# Patient Record
Sex: FEMALE | Race: WHITE | NOT HISPANIC OR LATINO
[De-identification: names, ages, dates, MRNs, and addresses within clinical notes are randomized per-mention and may not be internally consistent; named-entity substitution may affect disease eponyms.]

---

## 2020-11-16 ENCOUNTER — APPOINTMENT (OUTPATIENT)
Dept: VASCULAR SURGERY | Facility: CLINIC | Age: 80
End: 2020-11-16
Payer: MEDICARE

## 2020-11-16 PROCEDURE — 29580 STRAPPING UNNA BOOT: CPT | Mod: RT

## 2020-11-16 PROCEDURE — 99203 OFFICE O/P NEW LOW 30 MIN: CPT | Mod: 25

## 2020-11-16 NOTE — PHYSICAL EXAM
[Respiratory Effort] : normal respiratory effort [Normal Rate and Rhythm] : normal rate and rhythm [Alert] : alert [Oriented to Person] : oriented to person [Oriented to Place] : oriented to place [Oriented to Time] : oriented to time [Calm] : calm [2+] : left 2+ [Ankle Swelling (On Exam)] : present [Ankle Swelling On The Right] : mild [Varicose Veins Of Lower Extremities] : not present [] : not present [Abdomen Tenderness] : ~T ~M No abdominal tenderness [de-identified] : Well appearing in no apparent acute distress  [de-identified] : NC/AT  [de-identified] : Supple  [de-identified] : FROM [de-identified] : RLE: small ulceration to the medial aspect of the malleolar region 0.3x0.4 cm with no active drainage, no surrounding erythema and no signs of infection. Feet are pink, toes are warm to touch with adequate capillary refill.

## 2020-11-16 NOTE — HISTORY OF PRESENT ILLNESS
[FreeTextEntry1] : 79 y/o F known w/hx venous insufficiency s/p BLE RFA (2005) presents for initial evaluation of RLE venous ulcer. Pt notice small ulceration to the medial aspect of the R malleolar that is mildly painful. She reports she is active through daily walking and uses cane for balance. \par Denies any skin breakdown, ulcerations, open sores, fever or chills.

## 2020-11-16 NOTE — PROCEDURE
[FreeTextEntry1] : RLE Wound care: Wounds cleaned with hydrogen peroxide, A&D ointment applied. Wrapped with unna boot, Kerlix and ACE bandage.\par

## 2020-11-16 NOTE — ASSESSMENT
[FreeTextEntry1] : 80 y /o F w/hx of BLE venous insufficiency s/p BLE RFA presents with RLE ulcer. On exam, RLE: small ulceration to the medial aspect of the malleolar region 0.3x0.4 cm with no active drainage, no surrounding erythema and no signs of infection. Feet are pink, toes are warm to touch with adequate capillary refill. RLE Unna boot place, to f/u with us here in 1 week for a new unna boot placement. No clinical signs of infection and no abx required at the moment. Patient to contact the office in case she develops any concerning symptoms. \par

## 2020-11-16 NOTE — ADDENDUM
[FreeTextEntry1] : This note was written by Jackie Ruvalcaba on 11/16/2020 acting as scribe for Sherry Dorsey M.D.\par \par I, Sherry Cardenas have read and attest that all the information, medical decision making and discharge instructions within are true and accurate.

## 2020-11-23 ENCOUNTER — APPOINTMENT (OUTPATIENT)
Dept: VASCULAR SURGERY | Facility: CLINIC | Age: 80
End: 2020-11-23
Payer: MEDICARE

## 2020-11-23 PROCEDURE — 99212 OFFICE O/P EST SF 10 MIN: CPT | Mod: 25

## 2020-11-23 PROCEDURE — 29580 STRAPPING UNNA BOOT: CPT | Mod: RT

## 2020-11-24 NOTE — HISTORY OF PRESENT ILLNESS
[FreeTextEntry1] : 79 y/o F known w/hx venous insufficiency s/p BLE RFA (2005) presents for follow up evaluation of RLE venous ulcer. Pt with slowly healing small ulceration to the medial aspect of the R malleolar. She c/o mild pain but reports she has been active through daily walking and uses cane for balance. \par Denies any skin breakdown, ulcerations, open sores, fever or chills.

## 2020-11-24 NOTE — ASSESSMENT
[FreeTextEntry1] : 80 y /o F w/hx of BLE venous insufficiency s/p BLE RFA and RLE ulcer undergoing UNNA boot wraps presents for f/u. On exam, RLE: small ulceration to the medial aspect of the malleolar region 0.2 x0.3cm with no active drainage, no surrounding erythema and no signs of infection. Feet are pink, toes are warm to touch with adequate capillary refill. \par RLE Unna boot place, to f/u with us here in 1 week for a new unna boot placement.  [Arterial/Venous Disease] : arterial/venous disease [Ulcer Care] : ulcer care

## 2020-11-24 NOTE — PHYSICAL EXAM
[Respiratory Effort] : normal respiratory effort [Normal Rate and Rhythm] : normal rate and rhythm [2+] : left 2+ [Ankle Swelling (On Exam)] : present [Ankle Swelling On The Right] : mild [Alert] : alert [Oriented to Person] : oriented to person [Oriented to Place] : oriented to place [Oriented to Time] : oriented to time [Calm] : calm [Varicose Veins Of Lower Extremities] : not present [] : not present [Abdomen Tenderness] : ~T ~M No abdominal tenderness [de-identified] : Well appearing in no apparent acute distress  [de-identified] : NC/AT  [de-identified] : Supple  [de-identified] : FROM [de-identified] : RLE: small ulceration to the medial aspect of the malleolar region 0.2 x0.3cm with no active drainage, no surrounding erythema and no signs of infection. Feet are pink, toes are warm to touch with adequate capillary refill.

## 2020-11-24 NOTE — ADDENDUM
[FreeTextEntry1] : This note was written by Jackie Ruvalcaba on 11/23/2020 acting as scribe for Sherry Dorsey M.D.\par \par I, Sherry Cardenas have read and attest that all the information, medical decision making and discharge instructions within are true and accurate.

## 2020-11-30 ENCOUNTER — APPOINTMENT (OUTPATIENT)
Dept: VASCULAR SURGERY | Facility: CLINIC | Age: 80
End: 2020-11-30
Payer: MEDICARE

## 2020-11-30 PROCEDURE — 29580 STRAPPING UNNA BOOT: CPT | Mod: RT

## 2020-11-30 PROCEDURE — 99212 OFFICE O/P EST SF 10 MIN: CPT | Mod: 25

## 2020-12-01 NOTE — PHYSICAL EXAM
[Respiratory Effort] : normal respiratory effort [Normal Rate and Rhythm] : normal rate and rhythm [2+] : left 2+ [Ankle Swelling (On Exam)] : present [Ankle Swelling On The Right] : mild [Alert] : alert [Oriented to Person] : oriented to person [Oriented to Place] : oriented to place [Oriented to Time] : oriented to time [Calm] : calm [Varicose Veins Of Lower Extremities] : not present [] : not present [Abdomen Tenderness] : ~T ~M No abdominal tenderness [de-identified] : Well appearing in no apparent acute distress  [de-identified] : NC/AT  [de-identified] : Supple  [de-identified] : FROM [de-identified] : RLE: small ulceration to the medial aspect of the malleolar region 0.1 x0.2 cm with no active drainage, no surrounding erythema and no signs of infection. Feet are pink, toes are warm to touch with adequate capillary refill.

## 2020-12-01 NOTE — HISTORY OF PRESENT ILLNESS
[FreeTextEntry1] : 81 y/o F w/hx venous insufficiency s/p BLE RFA (2005) presents for follow up evaluation of RLE venous ulcer. Pt undergoing treatment with UNNA boots for slowly healing small ulceration to the medial aspect of the R malleolus . She c/o mild pain but reports she has been active through daily walking and uses cane for balance. She removed the UNNA boot yesterday and is here today for a new one to be placed. \par Denies any skin breakdown, ulcerations, open sores, fever or chills.

## 2020-12-01 NOTE — ASSESSMENT
[FreeTextEntry1] : 80 y /o F w/hx of BLE venous insufficiency s/p BLE RFA and RLE ulcer undergoing UNNA boot wraps presents for f/u On exam, RLE: small ulceration to the medial aspect of the malleolar region 0.1 x 0.2 cm with no active drainage, no surrounding erythema and no signs of infection. Feet are pink, toes are warm to touch with adequate capillary refill. \par RLE Unna boot place, to f/u with us here in 1 week for a new unna boot placement.

## 2020-12-07 ENCOUNTER — APPOINTMENT (OUTPATIENT)
Dept: VASCULAR SURGERY | Facility: CLINIC | Age: 80
End: 2020-12-07
Payer: MEDICARE

## 2020-12-07 PROCEDURE — 29580 STRAPPING UNNA BOOT: CPT | Mod: RT

## 2020-12-07 PROCEDURE — 99212 OFFICE O/P EST SF 10 MIN: CPT | Mod: 25

## 2020-12-07 NOTE — PHYSICAL EXAM
[Respiratory Effort] : normal respiratory effort [Normal Rate and Rhythm] : normal rate and rhythm [2+] : left 2+ [Ankle Swelling (On Exam)] : present [Ankle Swelling On The Right] : mild [Alert] : alert [Oriented to Person] : oriented to person [Oriented to Place] : oriented to place [Oriented to Time] : oriented to time [Calm] : calm [Varicose Veins Of Lower Extremities] : not present [] : not present [Abdomen Tenderness] : ~T ~M No abdominal tenderness [de-identified] : Well appearing in no apparent acute distress  [de-identified] : NC/AT  [de-identified] : Supple  [de-identified] : FROM [de-identified] : RLE: small ulceration to the medial aspect of the malleolar region 0.7 x0.1 cm with no active drainage, no surrounding erythema and no signs of infection. Feet are pink, toes are warm to touch with adequate capillary refill.

## 2020-12-07 NOTE — HISTORY OF PRESENT ILLNESS
[FreeTextEntry1] : 81 y/o F w/hx venous insufficiency s/p BLE RFA (2005) presents for follow up evaluation of RLE venous ulcer. Pt undergoing treatment with UNNA boots for slowly healing small ulceration to the medial aspect of the R malleolus . She continues to c/o mild pain but has been somewhat controlled through the past week. She has been active through daily walking and uses cane for balance. She removed the UNNA boot yesterday and is here today for a new one to be placed. \par Denies any skin breakdown, ulcerations, open sores, fever or chills.

## 2020-12-07 NOTE — ASSESSMENT
[FreeTextEntry1] : 80 y /o F w/hx of BLE venous insufficiency s/p BLE RFA and RLE ulcer undergoing UNNA boot wraps presents for f/u On exam, RLE: small ulceration to the medial aspect of the malleolar region 0.7 x 0.1 cm with no active drainage, no surrounding erythema and no signs of infection. Feet are pink, toes are warm to touch with adequate capillary refill. \par RLE Unna boot place, to f/u with us here in 1 week for a new unna boot placement.  [Foot care/Footwear] : foot care/footwear [Ulcer Care] : ulcer care

## 2020-12-07 NOTE — ADDENDUM
[FreeTextEntry1] : This note was written by Jackie Ruvalcaba on 12/07/2020 acting as scribe for Sherry Dorsey M.D.\par \par I, Sherry Cardenas have read and attest that all the information, medical decision making and discharge instructions within are true and accurate.

## 2020-12-14 ENCOUNTER — APPOINTMENT (OUTPATIENT)
Dept: VASCULAR SURGERY | Facility: CLINIC | Age: 80
End: 2020-12-14
Payer: MEDICARE

## 2020-12-14 DIAGNOSIS — L97.909 NON-PRESSURE CHRONIC ULCER OF UNSPECIFIED PART OF UNSPECIFIED LOWER LEG WITH UNSPECIFIED SEVERITY: ICD-10-CM

## 2020-12-14 PROCEDURE — 29580 STRAPPING UNNA BOOT: CPT | Mod: RT

## 2020-12-14 PROCEDURE — 99212 OFFICE O/P EST SF 10 MIN: CPT | Mod: 25

## 2020-12-16 PROBLEM — L97.909 ULCER OF LOWER EXTREMITY, UNSPECIFIED LATERALITY, UNSPECIFIED ULCER STAGE: Status: ACTIVE | Noted: 2020-12-16

## 2020-12-16 RX ORDER — SILVER SULFADIAZINE 10 MG/G
1 CREAM TOPICAL DAILY
Qty: 30 | Refills: 0 | Status: ACTIVE | COMMUNITY
Start: 2020-12-16 | End: 1900-01-01

## 2020-12-16 NOTE — ADDENDUM
[FreeTextEntry1] : This note was written by Jackie Ruvalcaba on 12/14/2020 acting as scribe for Sherry Dorsey M.D.\par \par I, Sherry Cardenas have read and attest that all the information, medical decision making and discharge instructions within are true and accurate.

## 2020-12-16 NOTE — ASSESSMENT
[FreeTextEntry1] : 80 y /o F w/hx of BLE venous insufficiency s/p BLE RFA and RLE ulcer undergoing UNNA boot wraps presents for f/u On exam, RLE: small ulceration to the medial aspect of the malleolar region 0.5 x 0.7 cm with no active drainage, no surrounding erythema and no signs of infection. Feet are pink, toes are warm to touch with adequate capillary refill. \par RLE Unna boot placed \par \par Plan: \par - She will  f/u with us here in 1 week for a new unna boot placement. \par - Recommended pt continue to activities as tolerated. \par - Patient to contact the office in case she develops any concerning symptoms.  [Foot care/Footwear] : foot care/footwear [Ulcer Care] : ulcer care

## 2020-12-21 ENCOUNTER — APPOINTMENT (OUTPATIENT)
Dept: VASCULAR SURGERY | Facility: CLINIC | Age: 80
End: 2020-12-21
Payer: MEDICARE

## 2020-12-21 DIAGNOSIS — I87.2 VENOUS INSUFFICIENCY (CHRONIC) (PERIPHERAL): ICD-10-CM

## 2020-12-21 PROCEDURE — 99213 OFFICE O/P EST LOW 20 MIN: CPT

## 2020-12-21 RX ORDER — FLUOCINONIDE 0.5 MG/G
0.05 CREAM TOPICAL TWICE DAILY
Qty: 30 | Refills: 0 | Status: ACTIVE | COMMUNITY
Start: 2020-12-21 | End: 1900-01-01

## 2020-12-21 NOTE — PHYSICAL EXAM
[Respiratory Effort] : normal respiratory effort [Normal Rate and Rhythm] : normal rate and rhythm [2+] : left 2+ [Ankle Swelling (On Exam)] : present [Ankle Swelling On The Right] : mild [Alert] : alert [Oriented to Person] : oriented to person [Oriented to Place] : oriented to place [Oriented to Time] : oriented to time [Calm] : calm [Varicose Veins Of Lower Extremities] : not present [] : not present [Abdomen Tenderness] : ~T ~M No abdominal tenderness [de-identified] : Well appearing  [de-identified] : NC/AT  [de-identified] : Supple  [de-identified] : FROM [de-identified] : RLE: small ulceration healed, skin to the medial aspect of the malleolus irritated and slightly red with no active drainage, no surrounding erythema and no signs of infection. Feet are pink, toes are warm to touch with adequate capillary refill.

## 2020-12-21 NOTE — HISTORY OF PRESENT ILLNESS
[FreeTextEntry1] : 81 y/o F w/hx venous insufficiency s/p BLE RFA (2005) presents for follow up evaluation of RLE venous ulcer. Pt undergoing treatment with UNNA boots for healing small ulcer to the medial aspect of the R malleolus. Patient reports feeling well, she reports her superficial ulcer has now healed but notice her leg to become irritated and red. She has been active through daily walking and uses cane for balance. She removed the UNNA boot yesterday.  \par Denies any skin breakdown, ulcerations, open sores, fever or chills.

## 2020-12-21 NOTE — ADDENDUM
[FreeTextEntry1] : This note was written by Jackie Ruvalcaba on 12/21/2020 acting as scribe for Sherry Dorsey M.D.\par \par I, Sherry Cardenas have read and attest that all the information, medical decision making and discharge instructions within are true and accurate.

## 2020-12-21 NOTE — ASSESSMENT
[Foot care/Footwear] : foot care/footwear [Ulcer Care] : ulcer care [FreeTextEntry1] : 80 y /o F w/hx of BLE venous insufficiency s/p BLE RFA and RLE ulcer undergoing UNNA boot wraps presents for f/u On exam, RLE: small ulceration healed, skin to the medial aspect of the malleolus irritated and slightly red with no active drainage, no surrounding erythema and no signs of infection. Feet are pink, toes are warm to touch with adequate capillary refill. \par \par Plan: \par - Patient with RLE small ulceration healed. \par - Recommended patient to apply Lidex cream daily over R medial aspect of malleolus to help skin irritation. \par - Moisturize skin daily to avoid any skin break down and continue wearing compression stockings daily\par - She will f/u with us here PRN. \par - Patient to contact the office in case she develops any concerning symptoms.

## 2021-01-04 ENCOUNTER — APPOINTMENT (OUTPATIENT)
Dept: VASCULAR SURGERY | Facility: CLINIC | Age: 81
End: 2021-01-04
Payer: MEDICARE

## 2021-01-04 PROCEDURE — 99212 OFFICE O/P EST SF 10 MIN: CPT | Mod: 25

## 2021-01-04 PROCEDURE — 29580 STRAPPING UNNA BOOT: CPT

## 2021-01-04 NOTE — PHYSICAL EXAM
[2+] : left 2+ [Ankle Swelling (On Exam)] : present [Varicose Veins Of Lower Extremities] : not present [] : present [Skin Ulcer] : ulcer [Calm] : calm [de-identified] : pleasant

## 2021-03-11 NOTE — HISTORY OF PRESENT ILLNESS
[FreeTextEntry1] : pt comes in for eval of the ulcer\par pain is less\par she has been treated with unna boots\par 
175.26

## 2023-05-31 ENCOUNTER — APPOINTMENT (OUTPATIENT)
Dept: VASCULAR SURGERY | Facility: CLINIC | Age: 83
End: 2023-05-31

## 2023-06-02 ENCOUNTER — APPOINTMENT (OUTPATIENT)
Dept: VASCULAR SURGERY | Facility: CLINIC | Age: 83
End: 2023-06-02
Payer: MEDICARE

## 2023-06-02 VITALS
SYSTOLIC BLOOD PRESSURE: 117 MMHG | TEMPERATURE: 98.4 F | HEART RATE: 55 BPM | HEIGHT: 70 IN | DIASTOLIC BLOOD PRESSURE: 72 MMHG | OXYGEN SATURATION: 97 % | RESPIRATION RATE: 15 BRPM | WEIGHT: 190 LBS | BODY MASS INDEX: 27.2 KG/M2

## 2023-06-02 DIAGNOSIS — Z86.39 PERSONAL HISTORY OF OTHER ENDOCRINE, NUTRITIONAL AND METABOLIC DISEASE: ICD-10-CM

## 2023-06-02 DIAGNOSIS — Z86.79 PERSONAL HISTORY OF OTHER DISEASES OF THE CIRCULATORY SYSTEM: ICD-10-CM

## 2023-06-02 PROCEDURE — 99203 OFFICE O/P NEW LOW 30 MIN: CPT

## 2023-06-02 PROCEDURE — 93970 EXTREMITY STUDY: CPT

## 2023-06-02 NOTE — HISTORY OF PRESENT ILLNESS
[FreeTextEntry1] : I just had the pleasure of seeing Mrs. Rita Rojo in consultation from  lower extremity venous insufficiency.\par \par Mrs. Rojo is a prashant 82 year old lady who presents with.\par \par Her past medical history is otherwise significant for\par \par Medications: \par \par Allergies: \par \par FH: NC\par \par SH:

## 2023-06-02 NOTE — PHYSICAL EXAM
[Respiratory Effort] : normal respiratory effort [Normal Rate and Rhythm] : normal rate and rhythm [2+] : left 2+ [Ankle Swelling (On Exam)] : present [Ankle Swelling On The Right] : mild [Alert] : alert [Oriented to Person] : oriented to person [Oriented to Place] : oriented to place [Oriented to Time] : oriented to time [Calm] : calm [Varicose Veins Of Lower Extremities] : not present [] : not present [Abdomen Tenderness] : ~T ~M No abdominal tenderness [de-identified] : NC/AT  [de-identified] : Supple  [de-identified] : FROM [de-identified] : RLE: small ulceration to the medial aspect of the malleolar region 0.5 x0..7 cm with no active drainage, no surrounding erythema and no signs of infection. Feet are pink, toes are warm to touch with adequate capillary refill.  [de-identified] : On physical exam, the patient is in no acute distress and neurologically intact. The lungs are clear to auscultation and the heart has a regular rate and rhythm.

## 2023-06-02 NOTE — ASSESSMENT
[FreeTextEntry1] : In summary, Mrs. Rojo presents with a history of bilateral lower extremity venous insufficiency. A venous duplex was obtained in the office today and showed no evidence of DVT/SVT in either leg. There is reflux in an accessory left great saphenous vein, distal (calf) great saphenous vein and small saphenous vein. She would like to undergo intervention for her venous insufficiency but is unsure whether she can travel to [place] for the procedures. She will contact me after she investigates travel options. She would be scheduled for left AGSV and SSV RFA and distal GSV UGFS and left leg stab phlebectomy. \par \par In the meantime, I have instructed her to wear compression stockings daily. \par \par Thank you for allowing me to participate in the care of this nice lady. Please do not hesitate to contact me with any questions.

## 2023-06-02 NOTE — PHYSICAL EXAM
[de-identified] : On physical exam, the patient is in no acute distress and neurologically intact. The lungs are clear to auscultation and the heart has a regular rate and rhythm. Abdomen is benign. Bilateral femoral and pedal pulses are palpable. Left ankle edema is present. Evidence of healed ulcers of bilateral medial ankles.

## 2023-06-02 NOTE — HISTORY OF PRESENT ILLNESS
[FreeTextEntry1] : I just had the pleasure of seeing Mrs. Rita Rojo in consultation from Dr. Ashley La for lower extremity venous insufficiency.\par \par Mrs. Rojo is a prashant 82 year old lady who presents with a history of lower extremity venous insufficiency. She underwent bilateral great saphenous vein ablation and right small saphenous vein radiofrequency ablation by Dr. Holland almost 20 years ago. She has had recurrent episodes of ulceration of bilateral medial calves, most recently treated in 2021 with Unna boot therapy. She reports bilateral leg edema and heaviness. She has been wearing compression stockings daily for many years. She denies any history of lower extremity claudication, rest pain, or tissue loss. 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 Former smoker. Smoked up to 1.2 pack cigarettes/day from 1960-1980s. \par \par FH: NC\par \par ROS: as above

## 2023-06-02 NOTE — PHYSICAL EXAM
[Respiratory Effort] : normal respiratory effort [Normal Rate and Rhythm] : normal rate and rhythm [2+] : left 2+ [Ankle Swelling (On Exam)] : present [Ankle Swelling On The Right] : mild [Alert] : alert [Oriented to Person] : oriented to person [Oriented to Place] : oriented to place [Oriented to Time] : oriented to time [Calm] : calm [Varicose Veins Of Lower Extremities] : not present [] : not present [Abdomen Tenderness] : ~T ~M No abdominal tenderness [de-identified] : NC/AT  [de-identified] : Supple  [de-identified] : FROM [de-identified] : RLE: small ulceration to the medial aspect of the malleolar region 0.5 x0..7 cm with no active drainage, no surrounding erythema and no signs of infection. Feet are pink, toes are warm to touch with adequate capillary refill.  [de-identified] : On physical exam, the patient is in no acute distress and neurologically intact. The lungs are clear to auscultation and the heart has a regular rate and rhythm.

## 2023-06-05 ENCOUNTER — TRANSCRIPTION ENCOUNTER (OUTPATIENT)
Age: 83
End: 2023-06-05

## 2023-11-24 ENCOUNTER — NON-APPOINTMENT (OUTPATIENT)
Age: 83
End: 2023-11-24

## 2023-11-29 ENCOUNTER — APPOINTMENT (OUTPATIENT)
Dept: HEMATOLOGY ONCOLOGY | Facility: CLINIC | Age: 83
End: 2023-11-29
Payer: MEDICARE

## 2023-11-29 ENCOUNTER — LABORATORY RESULT (OUTPATIENT)
Age: 83
End: 2023-11-29

## 2023-11-29 VITALS
SYSTOLIC BLOOD PRESSURE: 142 MMHG | DIASTOLIC BLOOD PRESSURE: 90 MMHG | BODY MASS INDEX: 32.28 KG/M2 | TEMPERATURE: 96.5 F | OXYGEN SATURATION: 96 % | WEIGHT: 171 LBS | HEART RATE: 70 BPM | HEIGHT: 61 IN

## 2023-11-29 DIAGNOSIS — Z78.9 OTHER SPECIFIED HEALTH STATUS: ICD-10-CM

## 2023-11-29 DIAGNOSIS — M19.90 UNSPECIFIED OSTEOARTHRITIS, UNSPECIFIED SITE: ICD-10-CM

## 2023-11-29 DIAGNOSIS — Z83.2 FAMILY HISTORY OF DISEASES OF THE BLOOD AND BLOOD-FORMING ORGANS AND CERTAIN DISORDERS INVOLVING THE IMMUNE MECHANISM: ICD-10-CM

## 2023-11-29 DIAGNOSIS — Z82.61 FAMILY HISTORY OF ARTHRITIS: ICD-10-CM

## 2023-11-29 DIAGNOSIS — H35.30 UNSPECIFIED MACULAR DEGENERATION: ICD-10-CM

## 2023-11-29 DIAGNOSIS — R23.3 SPONTANEOUS ECCHYMOSES: ICD-10-CM

## 2023-11-29 DIAGNOSIS — I10 ESSENTIAL (PRIMARY) HYPERTENSION: ICD-10-CM

## 2023-11-29 DIAGNOSIS — I83.90 ASYMPTOMATIC VARICOSE VEINS OF UNSPECIFIED LOWER EXTREMITY: ICD-10-CM

## 2023-11-29 DIAGNOSIS — Z80.3 FAMILY HISTORY OF MALIGNANT NEOPLASM OF BREAST: ICD-10-CM

## 2023-11-29 DIAGNOSIS — E78.5 HYPERLIPIDEMIA, UNSPECIFIED: ICD-10-CM

## 2023-11-29 DIAGNOSIS — Z87.891 PERSONAL HISTORY OF NICOTINE DEPENDENCE: ICD-10-CM

## 2023-11-29 DIAGNOSIS — Z82.3 FAMILY HISTORY OF STROKE: ICD-10-CM

## 2023-11-29 DIAGNOSIS — Z82.5 FAMILY HISTORY OF ASTHMA AND OTHER CHRONIC LOWER RESPIRATORY DISEASES: ICD-10-CM

## 2023-11-29 PROCEDURE — 36415 COLL VENOUS BLD VENIPUNCTURE: CPT

## 2023-11-29 PROCEDURE — 99204 OFFICE O/P NEW MOD 45 MIN: CPT | Mod: 25

## 2023-11-29 RX ORDER — LOSARTAN POTASSIUM 100 MG/1
100 TABLET, FILM COATED ORAL
Refills: 0 | Status: ACTIVE | COMMUNITY

## 2023-11-29 RX ORDER — VIT C/E/ZN/COPPR/LUTEIN/ZEAXAN 250MG-90MG
CAPSULE ORAL
Refills: 0 | Status: ACTIVE | COMMUNITY

## 2023-11-29 RX ORDER — ATORVASTATIN CALCIUM 20 MG/1
20 TABLET, FILM COATED ORAL
Refills: 0 | Status: ACTIVE | COMMUNITY

## 2023-11-29 RX ORDER — ATENOLOL 25 MG/1
25 TABLET ORAL
Refills: 0 | Status: ACTIVE | COMMUNITY

## 2023-11-29 RX ORDER — GINGER ROOT/GINGER ROOT EXT 262.5 MG
CAPSULE ORAL
Refills: 0 | Status: ACTIVE | COMMUNITY

## 2023-12-01 ENCOUNTER — NON-APPOINTMENT (OUTPATIENT)
Age: 83
End: 2023-12-01

## 2023-12-03 ENCOUNTER — NON-APPOINTMENT (OUTPATIENT)
Age: 83
End: 2023-12-03

## 2023-12-04 ENCOUNTER — LABORATORY RESULT (OUTPATIENT)
Age: 83
End: 2023-12-04

## 2023-12-04 ENCOUNTER — APPOINTMENT (OUTPATIENT)
Dept: HEMATOLOGY ONCOLOGY | Facility: CLINIC | Age: 83
End: 2023-12-04
Payer: MEDICARE

## 2023-12-04 VITALS
DIASTOLIC BLOOD PRESSURE: 70 MMHG | SYSTOLIC BLOOD PRESSURE: 108 MMHG | OXYGEN SATURATION: 97 % | BODY MASS INDEX: 25.91 KG/M2 | HEART RATE: 76 BPM | WEIGHT: 171 LBS | TEMPERATURE: 97.2 F | HEIGHT: 68 IN

## 2023-12-04 DIAGNOSIS — D68.9 COAGULATION DEFECT, UNSPECIFIED: ICD-10-CM

## 2023-12-04 LAB
ABO + RH PNL BLD: NORMAL
ALBUMIN MFR SERPL ELPH: 54.7 %
ALBUMIN SERPL ELPH-MCNC: 4.3 G/DL
ALBUMIN SERPL-MCNC: 4.1 G/DL
ALBUMIN/GLOB SERPL: 1.2 RATIO
ALP BLD-CCNC: 126 U/L
ALPHA1 GLOB MFR SERPL ELPH: 3.9 %
ALPHA1 GLOB SERPL ELPH-MCNC: 0.3 G/DL
ALPHA2 GLOB MFR SERPL ELPH: 11.4 %
ALPHA2 GLOB SERPL ELPH-MCNC: 0.9 G/DL
ALT SERPL-CCNC: 27 U/L
ANION GAP SERPL CALC-SCNC: 12 MMOL/L
APTT BLD: 37.1 SEC
AST SERPL-CCNC: 27 U/L
B-GLOBULIN MFR SERPL ELPH: 8.6 %
B-GLOBULIN SERPL ELPH-MCNC: 0.6 G/DL
B2 MICROGLOB SERPL-MCNC: 3.7 MG/L
BASOPHILS # BLD AUTO: 0.12 K/UL
BASOPHILS # BLD AUTO: 0.12 K/UL
BASOPHILS NFR BLD AUTO: 0.9 %
BASOPHILS NFR BLD AUTO: 0.9 %
BILIRUB SERPL-MCNC: 0.6 MG/DL
BUN SERPL-MCNC: 19 MG/DL
CALCIUM SERPL-MCNC: 10 MG/DL
CHLORIDE SERPL-SCNC: 97 MMOL/L
CO2 SERPL-SCNC: 26 MMOL/L
CREAT SERPL-MCNC: 0.88 MG/DL
CRP SERPL-MCNC: <3 MG/L
DEPRECATED KAPPA LC FREE/LAMBDA SER: 1.25 RATIO
EGFR: 66 ML/MIN/1.73M2
EOSINOPHIL # BLD AUTO: 0.12 K/UL
EOSINOPHIL # BLD AUTO: 0.12 K/UL
EOSINOPHIL NFR BLD AUTO: 0.9 %
EOSINOPHIL NFR BLD AUTO: 0.9 %
EPO SERPL-MCNC: 3.2 MIU/ML
ERYTHROCYTE [SEDIMENTATION RATE] IN BLOOD BY WESTERGREN METHOD: 2 MM/HR
FERRITIN SERPL-MCNC: 141 NG/ML
GAMMA GLOB FLD ELPH-MCNC: 1.6 G/DL
GAMMA GLOB MFR SERPL ELPH: 21.4 %
GLUCOSE SERPL-MCNC: 102 MG/DL
HCT VFR BLD CALC: 50.4 %
HGB BLD-MCNC: 15.9 G/DL
IGA SER QL IEP: 76 MG/DL
IGG SER QL IEP: 2012 MG/DL
IGM SER QL IEP: 58 MG/DL
INR PPP: 0.95
INTERPRETATION SERPL IEP-IMP: NORMAL
IRON SATN MFR SERPL: 36 %
IRON SERPL-MCNC: 101 UG/DL
JAK2 P.V617F BLD/T QL: NORMAL
KAPPA LC CSF-MCNC: 1.83 MG/DL
KAPPA LC SERPL-MCNC: 2.29 MG/DL
LDH SERPL-CCNC: 247 U/L
LYMPHOCYTES # BLD AUTO: 1.23 K/UL
LYMPHOCYTES # BLD AUTO: 1.23 K/UL
LYMPHOCYTES NFR BLD AUTO: 9.5 %
LYMPHOCYTES NFR BLD AUTO: 9.5 %
M PROTEIN MFR SERPL ELPH: 16.9 %
M PROTEIN SPEC IFE-MCNC: NORMAL
MAN DIFF?: NORMAL
MCHC RBC-ENTMCNC: 29.8 PG
MCHC RBC-ENTMCNC: 31.5 GM/DL
MCV RBC AUTO: 94.4 FL
MONOCLON BAND OBS SERPL: 1.3 G/DL
MONOCYTES # BLD AUTO: 0.34 K/UL
MONOCYTES # BLD AUTO: 0.34 K/UL
MONOCYTES NFR BLD AUTO: 2.6 %
MONOCYTES NFR BLD AUTO: 2.6 %
MSMEAR: NORMAL
NEUTROPHILS # BLD AUTO: 11.12 K/UL
NEUTROPHILS # BLD AUTO: 11.12 K/UL
NEUTROPHILS NFR BLD AUTO: 86.1 %
NEUTROPHILS NFR BLD AUTO: 86.1 %
PLAT MORPH BLD: ABNORMAL
PLATELET # BLD AUTO: 905 K/UL
POTASSIUM SERPL-SCNC: 5.8 MMOL/L
PROT SERPL-MCNC: 7.5 G/DL
PT BLD: 10.8 SEC
RBC # BLD: 5.34 M/UL
RBC # BLD: 5.34 M/UL
RBC # FLD: 13.7 %
RBC BLD AUTO: NORMAL
REFLEX:: NORMAL
RETICS # AUTO: 2.5 %
RETICS AGGREG/RBC NFR: 131.4 K/UL
SODIUM SERPL-SCNC: 135 MMOL/L
T(9;22)(ABL1,BCR)/CONTROL BLD/T: NORMAL
TIBC SERPL-MCNC: 282 UG/DL
UIBC SERPL-MCNC: 181 UG/DL
URATE SERPL-MCNC: 5.3 MG/DL
VWF AG PPP IA-ACNC: 153 %
VWF:RCO ACT/NOR PPP PL AGG: 97 %
WBC # FLD AUTO: 12.92 K/UL

## 2023-12-04 PROCEDURE — 99213 OFFICE O/P EST LOW 20 MIN: CPT | Mod: 25

## 2023-12-04 PROCEDURE — 36415 COLL VENOUS BLD VENIPUNCTURE: CPT

## 2023-12-04 RX ORDER — HYDROXYUREA 500 MG/1
500 CAPSULE ORAL
Qty: 60 | Refills: 0 | Status: COMPLETED | COMMUNITY
Start: 2023-12-04 | End: 2024-01-03

## 2023-12-05 ENCOUNTER — NON-APPOINTMENT (OUTPATIENT)
Age: 83
End: 2023-12-05

## 2023-12-05 LAB
ALBUMIN SERPL ELPH-MCNC: 4.1 G/DL
ALP BLD-CCNC: 102 U/L
ALT SERPL-CCNC: 28 U/L
ANION GAP SERPL CALC-SCNC: 9 MMOL/L
APTT BLD: 32.4 SEC
AST SERPL-CCNC: 20 U/L
BASOPHILS # BLD AUTO: 0 K/UL
BASOPHILS # BLD AUTO: 0 K/UL
BASOPHILS NFR BLD AUTO: 0 %
BASOPHILS NFR BLD AUTO: 0 %
BILIRUB SERPL-MCNC: 0.7 MG/DL
BUN SERPL-MCNC: 25 MG/DL
CALCIUM SERPL-MCNC: 9.7 MG/DL
CHLORIDE SERPL-SCNC: 99 MMOL/L
CO2 SERPL-SCNC: 24 MMOL/L
CREAT SERPL-MCNC: 1.08 MG/DL
EGFR: 51 ML/MIN/1.73M2
EOSINOPHIL # BLD AUTO: 0.31 K/UL
EOSINOPHIL # BLD AUTO: 0.31 K/UL
EOSINOPHIL NFR BLD AUTO: 2.6 %
EOSINOPHIL NFR BLD AUTO: 2.6 %
GIANT PLATELETS BLD QL SMEAR: PRESENT
GLUCOSE SERPL-MCNC: 105 MG/DL
HCT VFR BLD CALC: 45.5 %
HGB BLD-MCNC: 14.7 G/DL
INR PPP: 0.95
LDH SERPL-CCNC: 263 U/L
LYMPHOCYTES # BLD AUTO: 1.63 K/UL
LYMPHOCYTES # BLD AUTO: 1.63 K/UL
LYMPHOCYTES NFR BLD AUTO: 13.9 %
LYMPHOCYTES NFR BLD AUTO: 13.9 %
MAN DIFF?: NORMAL
MCHC RBC-ENTMCNC: 29.8 PG
MCHC RBC-ENTMCNC: 32.3 GM/DL
MCV RBC AUTO: 92.3 FL
MONOCYTES # BLD AUTO: 1.02 K/UL
MONOCYTES # BLD AUTO: 1.02 K/UL
MONOCYTES NFR BLD AUTO: 8.7 %
MONOCYTES NFR BLD AUTO: 8.7 %
MPL EXON 10 MUTATION: NORMAL
MSMEAR: NORMAL
NEUTROPHILS # BLD AUTO: 8.8 K/UL
NEUTROPHILS # BLD AUTO: 8.8 K/UL
NEUTROPHILS NFR BLD AUTO: 74.8 %
NEUTROPHILS NFR BLD AUTO: 74.8 %
PLAT MORPH BLD: ABNORMAL
PLATELET # BLD AUTO: 824 K/UL
POTASSIUM SERPL-SCNC: 5.1 MMOL/L
POTASSIUM SERPL-SCNC: 5.9 MMOL/L
PROT SERPL-MCNC: 7.2 G/DL
PT BLD: 10.8 SEC
RBC # BLD: 4.93 M/UL
RBC # BLD: 4.93 M/UL
RBC # FLD: 14.1 %
RBC BLD AUTO: NORMAL
RETICS # AUTO: 2.3 %
RETICS AGGREG/RBC NFR: 114.9 K/UL
SODIUM SERPL-SCNC: 132 MMOL/L
URATE SERPL-MCNC: 5.2 MG/DL
WBC # FLD AUTO: 11.76 K/UL

## 2023-12-06 LAB
CA-I SERPL-SCNC: 5.1 MG/DL
GENE XXX MUT ANL BLD/T: NORMAL

## 2023-12-08 LAB — VISCOSITY, SERUM: 1.9

## 2023-12-11 LAB — VWF MULTIMERS PPP IA-ACNC: NORMAL

## 2023-12-19 ENCOUNTER — APPOINTMENT (OUTPATIENT)
Dept: INTERVENTIONAL RADIOLOGY/VASCULAR | Facility: HOSPITAL | Age: 83
End: 2023-12-19

## 2023-12-19 ENCOUNTER — RESULT REVIEW (OUTPATIENT)
Age: 83
End: 2023-12-19

## 2023-12-19 ENCOUNTER — OUTPATIENT (OUTPATIENT)
Dept: OUTPATIENT SERVICES | Facility: HOSPITAL | Age: 83
LOS: 1 days | End: 2023-12-19
Payer: MEDICARE

## 2023-12-19 PROCEDURE — 88311 DECALCIFY TISSUE: CPT

## 2023-12-19 PROCEDURE — 77002 NEEDLE LOCALIZATION BY XRAY: CPT

## 2023-12-19 PROCEDURE — 88311 DECALCIFY TISSUE: CPT | Mod: 26

## 2023-12-19 PROCEDURE — 85097 BONE MARROW INTERPRETATION: CPT

## 2023-12-19 PROCEDURE — C1830: CPT

## 2023-12-19 PROCEDURE — 88305 TISSUE EXAM BY PATHOLOGIST: CPT

## 2023-12-19 PROCEDURE — 88365 INSITU HYBRIDIZATION (FISH): CPT | Mod: 26

## 2023-12-19 PROCEDURE — 38222 DX BONE MARROW BX & ASPIR: CPT

## 2023-12-19 PROCEDURE — 99152 MOD SED SAME PHYS/QHP 5/>YRS: CPT

## 2023-12-19 PROCEDURE — 88364 INSITU HYBRIDIZATION (FISH): CPT

## 2023-12-19 PROCEDURE — 88365 INSITU HYBRIDIZATION (FISH): CPT

## 2023-12-19 PROCEDURE — 88189 FLOWCYTOMETRY/READ 16 & >: CPT

## 2023-12-19 PROCEDURE — 88313 SPECIAL STAINS GROUP 2: CPT

## 2023-12-19 PROCEDURE — 88313 SPECIAL STAINS GROUP 2: CPT | Mod: 26

## 2023-12-19 PROCEDURE — 38222 DX BONE MARROW BX & ASPIR: CPT | Mod: RT

## 2023-12-19 PROCEDURE — 88341 IMHCHEM/IMCYTCHM EA ADD ANTB: CPT | Mod: 26,59

## 2023-12-19 PROCEDURE — 99153 MOD SED SAME PHYS/QHP EA: CPT

## 2023-12-19 PROCEDURE — 77002 NEEDLE LOCALIZATION BY XRAY: CPT | Mod: 26

## 2023-12-19 PROCEDURE — 88305 TISSUE EXAM BY PATHOLOGIST: CPT | Mod: 26

## 2023-12-19 PROCEDURE — 88342 IMHCHEM/IMCYTCHM 1ST ANTB: CPT | Mod: 26,59

## 2023-12-19 PROCEDURE — 88341 IMHCHEM/IMCYTCHM EA ADD ANTB: CPT

## 2023-12-20 LAB
ANISOCYTOSIS BLD QL: SLIGHT
BASOPHILS # BLD AUTO: 0 K/UL
BASOPHILS # BLD AUTO: 0 K/UL
BASOPHILS NFR BLD AUTO: 0 %
BASOPHILS NFR BLD AUTO: 0 %
EOSINOPHIL # BLD AUTO: 0.19 K/UL
EOSINOPHIL # BLD AUTO: 0.19 K/UL
EOSINOPHIL NFR BLD AUTO: 1.9 %
EOSINOPHIL NFR BLD AUTO: 1.9 %
HCT VFR BLD CALC: 44.7 %
HGB BLD-MCNC: 14.5 G/DL
LYMPHOCYTES # BLD AUTO: 1.22 K/UL
LYMPHOCYTES # BLD AUTO: 1.22 K/UL
LYMPHOCYTES NFR BLD AUTO: 12.3 %
LYMPHOCYTES NFR BLD AUTO: 12.3 %
MAN DIFF?: NORMAL
MCHC RBC-ENTMCNC: 30.5 PG
MCHC RBC-ENTMCNC: 32.4 GM/DL
MCV RBC AUTO: 94.1 FL
MONOCYTES # BLD AUTO: 0.47 K/UL
MONOCYTES # BLD AUTO: 0.47 K/UL
MONOCYTES NFR BLD AUTO: 4.7 %
MONOCYTES NFR BLD AUTO: 4.7 %
MSMEAR: NORMAL
MYELOCYTES NFR BLD: 0.9 %
NEUTROPHILS # BLD AUTO: 7.96 K/UL
NEUTROPHILS # BLD AUTO: 7.96 K/UL
NEUTROPHILS NFR BLD AUTO: 80.2 %
NEUTROPHILS NFR BLD AUTO: 80.2 %
PLAT MORPH BLD: NORMAL
PLATELET # BLD AUTO: 741 K/UL
RBC # BLD: 4.75 M/UL
RBC # FLD: 15.5 %
RBC BLD AUTO: ABNORMAL
WBC # FLD AUTO: 9.92 K/UL

## 2023-12-26 ENCOUNTER — APPOINTMENT (OUTPATIENT)
Dept: HEMATOLOGY ONCOLOGY | Facility: CLINIC | Age: 83
End: 2023-12-26
Payer: MEDICARE

## 2023-12-26 VITALS
HEART RATE: 81 BPM | SYSTOLIC BLOOD PRESSURE: 122 MMHG | OXYGEN SATURATION: 98 % | HEIGHT: 68 IN | WEIGHT: 177 LBS | BODY MASS INDEX: 26.83 KG/M2 | TEMPERATURE: 96.7 F | DIASTOLIC BLOOD PRESSURE: 66 MMHG

## 2023-12-26 LAB
ERYTHROCYTE [SEDIMENTATION RATE] IN BLOOD BY WESTERGREN METHOD: 2 MM/HR
RBC # BLD: 4.78 M/UL
RETICS # AUTO: 2.4 %
RETICS AGGREG/RBC NFR: 116.2 K/UL

## 2023-12-26 PROCEDURE — 36415 COLL VENOUS BLD VENIPUNCTURE: CPT

## 2023-12-26 PROCEDURE — 99214 OFFICE O/P EST MOD 30 MIN: CPT | Mod: 25

## 2023-12-26 NOTE — REVIEW OF SYSTEMS
[Skin Rash] : skin rash [Negative] : Allergic/Immunologic [Fever] : no fever [Night Sweats] : no night sweats [Recent Change In Weight] : ~T no recent weight change [Vision Problems] : no vision problems [Nosebleeds] : no nosebleeds [Chest Pain] : no chest pain [Shortness Of Breath] : no shortness of breath [Abdominal Pain] : no abdominal pain [Dysuria] : no dysuria [Joint Pain] : no joint pain [Joint Stiffness] : no joint stiffness [Dizziness] : no dizziness [Easy Bleeding] : no tendency for easy bleeding [de-identified] : Pruritic rash Left arm in the distribution of the blood pressure cuff [FreeTextEntry7] : Slight stomach upset after taking Hydroxyurea [de-identified] : Fading ecchymosis at prior IV site

## 2023-12-26 NOTE — HISTORY OF PRESENT ILLNESS
[de-identified] : Patient is an 83 year old female with a history of hypertension, MGUS (first diagnosed in 2014), hyperlipidemia, arthritis and chronic back problems and thrombocytosis, who  was recently diagnosed with JAK2 positive thrombocytosis/myeloproliferative disorder. Patient states that she was first made aware of an elevated platelet count earlier in 2023. At the initial consult on November 28th, the CBC revealed an elevated WBC to 12.92, hemoglobin 15.9, hematocrit 50.4 and a higher platelet count of 905,000 (was 655,000 in September). Patient was relatively asymptomatic at the time of the visit and presently. INR was normal but PTT was slightly elevated at 37.1. Von Willebrand Factor Antigen and Activity were normal and Lupus anticoagulant studies were negative. CMP was remarkable for a glucose of 102 and potassium 5.8 (likely due to elevated platelet count). B2MG and LDH were slightly elevated. SPEP/IPEP were consistent with patient's known IgG lambda MGUS, with M-spike 1.3 and IgG of 2012. JAK2 Mutation was positive and BCR/ABL was negative. The patient was started on hydroxyurea 500 mg daily and low-dose aspirin at 81 mg daily. Repeat studies revealed a Plasma potassium that was normal at 5.1. INR and PTT were both within the normal range and factor was XII, VIII, IX, and XI were all normal. . A repeat CBC on December 20 was significant for normal white blood count at 9.92, with slightly increased neutrophils in the differential, hemoglobin 14.5, hematocrit 44.7 and a platelet count of 741,000.  The patient underwent bone marrow aspirate and biopsy with intravenous sedation at  one week ago, the results of which are still pending. Denies fever, chills, sweats, other bleeding tendencies (hematuria, hematochezia, epistaxis, gingival bleeding), chest pain, abdominal pain, dizziness, shortness of breath, vision changes, unintentional weight loss or recent/frequent infections.

## 2023-12-26 NOTE — PHYSICAL EXAM
[Normal] : affect appropriate [de-identified] : no mucosal bleeding [de-identified] : Rare ectopic [de-identified] : Mild tenderness lumbar spine [de-identified] : LE varicosities [de-identified] : Full ROM, arthritic deformities in hands, left ankle swelling [de-identified] : Fading ecchymosis  right forearm, mild erythematous slightly raised maculopapular rash, left upper arm

## 2023-12-26 NOTE — ASSESSMENT
[FreeTextEntry1] : Patient is an 83 year old female with a history of hypertension, MGUS (first diagnosed in 2014), hyperlipidemia arthritis, chronic back problems, and recently diagnosed JAK2 positive myeloproliferative disorder.  The patient has been started on hydroxyurea 500 mg daily and low-dose aspirin.  Bone marrow aspirate and biopsy were performed in IR with intravenous sedation, the results of which are pending.  Von Willebrand studies are normal and not consistent with acquired von Willebrand's disease. Patient has a history of MGUS and this appears to be stable at the present time. Have ordered beta-2 microglobulin, ionized calcium, CBC with auto differential, comprehensive metabolic panel, C-reactive protein, haptoglobin, LDH, manual differential, plasma potassium, reticulocyte count, sedimentation rate and uric acid. Venipuncture was performed in the office today.  Recommendations pending results of today's blood work and pending bone marrow studies.  Patient was advised to call to discuss results and further management.

## 2023-12-26 NOTE — REASON FOR VISIT
[Follow-Up Visit] : a follow-up visit for [FreeTextEntry2] : Thrombocytosis, Elevated hematocrit, myeloproliferative disorder, positive JAK2 mutation,MGUS

## 2023-12-27 LAB
ALBUMIN SERPL ELPH-MCNC: 4.5 G/DL
ALP BLD-CCNC: 79 U/L
ALT SERPL-CCNC: 27 U/L
ANION GAP SERPL CALC-SCNC: 13 MMOL/L
ANISOCYTOSIS BLD QL: SLIGHT
AST SERPL-CCNC: 28 U/L
B2 MICROGLOB SERPL-MCNC: 3.4 MG/L
BASOPHILS # BLD AUTO: 0.09 K/UL
BASOPHILS # BLD AUTO: 0.09 K/UL
BASOPHILS NFR BLD AUTO: 0.9 %
BASOPHILS NFR BLD AUTO: 0.9 %
BILIRUB SERPL-MCNC: 0.7 MG/DL
BUN SERPL-MCNC: 20 MG/DL
BURR CELLS BLD QL SMEAR: PRESENT
CALCIUM SERPL-MCNC: 9.7 MG/DL
CHLORIDE SERPL-SCNC: 94 MMOL/L
CO2 SERPL-SCNC: 22 MMOL/L
CREAT SERPL-MCNC: 0.83 MG/DL
CRP SERPL-MCNC: <3 MG/L
EGFR: 70 ML/MIN/1.73M2
EOSINOPHIL # BLD AUTO: 0.09 K/UL
EOSINOPHIL # BLD AUTO: 0.09 K/UL
EOSINOPHIL NFR BLD AUTO: 0.9 %
EOSINOPHIL NFR BLD AUTO: 0.9 %
GIANT PLATELETS BLD QL SMEAR: PRESENT
GLUCOSE SERPL-MCNC: 97 MG/DL
HAPTOGLOB SERPL-MCNC: 110 MG/DL
HCT VFR BLD CALC: 45.1 %
HGB BLD-MCNC: 14.4 G/DL
LDH SERPL-CCNC: 250 U/L
LYMPHOCYTES # BLD AUTO: 1.68 K/UL
LYMPHOCYTES # BLD AUTO: 1.68 K/UL
LYMPHOCYTES NFR BLD AUTO: 16.8 %
LYMPHOCYTES NFR BLD AUTO: 16.8 %
MACROCYTES BLD QL SMEAR: SLIGHT
MAN DIFF?: NORMAL
MCHC RBC-ENTMCNC: 30.1 PG
MCHC RBC-ENTMCNC: 31.9 GM/DL
MCV RBC AUTO: 94.4 FL
MICROCYTES BLD QL SMEAR: SLIGHT
MONOCYTES # BLD AUTO: 0.35 K/UL
MONOCYTES # BLD AUTO: 0.35 K/UL
MONOCYTES NFR BLD AUTO: 3.5 %
MONOCYTES NFR BLD AUTO: 3.5 %
MSMEAR: NORMAL
NEUTROPHILS # BLD AUTO: 7.8 K/UL
NEUTROPHILS # BLD AUTO: 7.8 K/UL
NEUTROPHILS NFR BLD AUTO: 77.9 %
NEUTROPHILS NFR BLD AUTO: 77.9 %
OVALOCYTES BLD QL SMEAR: SLIGHT
PLAT MORPH BLD: ABNORMAL
PLATELET # BLD AUTO: 756 K/UL
POIKILOCYTOSIS BLD QL SMEAR: SLIGHT
POLYCHROMASIA BLD QL SMEAR: SLIGHT
POTASSIUM SERPL-SCNC: 5.1 MMOL/L
POTASSIUM SERPL-SCNC: 5.7 MMOL/L
PROT SERPL-MCNC: 6.9 G/DL
RBC # BLD: 4.78 M/UL
RBC # FLD: 15.9 %
RBC BLD AUTO: ABNORMAL
SMUDGE CELLS # BLD: PRESENT
SODIUM SERPL-SCNC: 130 MMOL/L
URATE SERPL-MCNC: 4.9 MG/DL
WBC # FLD AUTO: 10.01 K/UL

## 2023-12-28 ENCOUNTER — NON-APPOINTMENT (OUTPATIENT)
Age: 83
End: 2023-12-28

## 2023-12-28 LAB — CA-I SERPL-SCNC: 4.9 MG/DL

## 2023-12-31 ENCOUNTER — NON-APPOINTMENT (OUTPATIENT)
Age: 83
End: 2023-12-31

## 2024-01-09 LAB
HEMATOPATHOLOGY REPORT: SIGNIFICANT CHANGE UP
HEMATOPATHOLOGY REPORT: SIGNIFICANT CHANGE UP

## 2024-01-29 ENCOUNTER — APPOINTMENT (OUTPATIENT)
Dept: HEMATOLOGY ONCOLOGY | Facility: CLINIC | Age: 84
End: 2024-01-29
Payer: MEDICARE

## 2024-01-29 VITALS
WEIGHT: 174 LBS | HEART RATE: 72 BPM | BODY MASS INDEX: 26.37 KG/M2 | HEIGHT: 68 IN | OXYGEN SATURATION: 98 % | DIASTOLIC BLOOD PRESSURE: 62 MMHG | TEMPERATURE: 98 F | SYSTOLIC BLOOD PRESSURE: 128 MMHG

## 2024-01-29 LAB
ERYTHROCYTE [SEDIMENTATION RATE] IN BLOOD BY WESTERGREN METHOD: 2 MM/HR
RBC # BLD: 4.09 M/UL
RETICS # AUTO: 2.8 %
RETICS AGGREG/RBC NFR: 113.3 K/UL

## 2024-01-29 PROCEDURE — 99214 OFFICE O/P EST MOD 30 MIN: CPT | Mod: 25

## 2024-01-29 PROCEDURE — 36415 COLL VENOUS BLD VENIPUNCTURE: CPT

## 2024-01-29 NOTE — HISTORY OF PRESENT ILLNESS
[de-identified] : Patient is an 83 year old female with a history of hypertension, MGUS (first diagnosed in 2014), hyperlipidemia, arthritis and chronic back problems and thrombocytosis, diagnosed with JAK2 positive thrombocytosis/myeloproliferative disorder who returns for follow up. Patient states that she was first made aware of an elevated platelet count earlier in 2023. At the last visit in December 2023 , the CBC was significant for white blood count of 10.01, hemoglobin 14.4, hematocrit 45.1 and a platelet count of 756,000. Comprehensive metabolic panel was significant for a sodium of 130 and a potassium of 5.7. However plasma potassium was normal at 5.1. Beta-2 microglobulin was 3.4, . Haptoglobin, C-reactive protein, uric acid, and sed rate were normal. 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 No increase in blasts and no increase in reticulin fibrosis. NGS revealed positive JAK2 mutation and cytogenetics revealed a normal female karyotype.  Patient is on 1000 mg hydroxyurea capsules on Monday, Wednesday, and Friday, and  one 500 mg. capsule the remaining days of the week. Patient experiences constipation while on the  higher dose Hydroxyurea and takes prunes for relief.  Today, the patient feels fatigued and complains of joint pain in knees, ankles, back and hands due to arthritis. She does experience pruritus. Patient's left arm skin rash has since healed. Denies bruising excessively, fever, chills, drenching night sweats, other bleeding tendencies (hematuria, hematochezia, epistaxis, gingival bleeding), headaches, chest pain, abdominal pain, dizziness, shortness of breath, vision changes, unintentional weight loss or recent/frequent infections.

## 2024-01-29 NOTE — REASON FOR VISIT
[Follow-Up Visit] : a follow-up visit for [FreeTextEntry2] : Elevated hemoglobin, elevated hematocrit, thrombocytosis, JAK2 positive mutation

## 2024-01-29 NOTE — ASSESSMENT
[FreeTextEntry1] : Patient is an 83 year old female with a history of hypertension, stable MGUS (first diagnosed in 2014), hyperlipidemia arthritis, chronic back problems, and diagnosed JAK2 positive myeloproliferative disorder, confirmed by bone marrow aspirate and biopsy, who returns for follow up. Have ordered B2MG, ionized calcium, CBC with auto differential, CMP, CRP, haptoglobin, LDH, manual differential, plasma potassium, reticulocyte count, sed rate and uric acid. Venipuncture was performed in the office today. Patient was advised to call to discuss results and further management.

## 2024-01-29 NOTE — PHYSICAL EXAM
[Normal] : affect appropriate [de-identified] : no mucosal bleeding [de-identified] : Rare ectopic [de-identified] : LE varicosities [de-identified] : Mild tenderness lumbar spine [de-identified] : Full ROM, arthritic deformities in hands, left ankle swelling

## 2024-01-29 NOTE — REVIEW OF SYSTEMS
[Fatigue] : fatigue [Joint Pain] : joint pain [Joint Stiffness] : joint stiffness [Negative] : Allergic/Immunologic [Fever] : no fever [Chills] : no chills [Night Sweats] : no night sweats [Recent Change In Weight] : ~T no recent weight change [Nosebleeds] : no nosebleeds [Chest Pain] : no chest pain [Shortness Of Breath] : no shortness of breath [Abdominal Pain] : no abdominal pain [Skin Rash] : no skin rash [Dizziness] : no dizziness [Easy Bleeding] : no tendency for easy bleeding [Easy Bruising] : no tendency for easy bruising [FreeTextEntry9] : Knees, ankles, back, hands due to arthritis

## 2024-01-31 ENCOUNTER — NON-APPOINTMENT (OUTPATIENT)
Age: 84
End: 2024-01-31

## 2024-01-31 LAB
ALBUMIN SERPL ELPH-MCNC: 4.4 G/DL
ALP BLD-CCNC: 66 U/L
ALT SERPL-CCNC: 31 U/L
ANION GAP SERPL CALC-SCNC: 10 MMOL/L
AST SERPL-CCNC: 27 U/L
B2 MICROGLOB SERPL-MCNC: 4.5 MG/L
BASOPHILS # BLD AUTO: 0.23 K/UL
BASOPHILS # BLD AUTO: 0.23 K/UL
BASOPHILS NFR BLD AUTO: 2.7 %
BASOPHILS NFR BLD AUTO: 2.7 %
BILIRUB SERPL-MCNC: 0.5 MG/DL
BUN SERPL-MCNC: 41 MG/DL
CA-I SERPL-SCNC: 4.8 MG/DL
CALCIUM SERPL-MCNC: 9.5 MG/DL
CHLORIDE SERPL-SCNC: 96 MMOL/L
CO2 SERPL-SCNC: 25 MMOL/L
CREAT SERPL-MCNC: 0.99 MG/DL
CRP SERPL-MCNC: <3 MG/L
EGFR: 57 ML/MIN/1.73M2
EOSINOPHIL # BLD AUTO: 0.15 K/UL
EOSINOPHIL # BLD AUTO: 0.15 K/UL
EOSINOPHIL NFR BLD AUTO: 1.8 %
EOSINOPHIL NFR BLD AUTO: 1.8 %
GLUCOSE SERPL-MCNC: 93 MG/DL
HAPTOGLOB SERPL-MCNC: 82 MG/DL
HCT VFR BLD CALC: 40 %
HGB BLD-MCNC: 12.9 G/DL
HYPOCHROMIA BLD QL SMEAR: SLIGHT
LDH SERPL-CCNC: 231 U/L
LYMPHOCYTES # BLD AUTO: 0.84 K/UL
LYMPHOCYTES # BLD AUTO: 0.84 K/UL
LYMPHOCYTES NFR BLD AUTO: 9.8 %
LYMPHOCYTES NFR BLD AUTO: 9.8 %
MAN DIFF?: NORMAL
MCHC RBC-ENTMCNC: 31.5 PG
MCHC RBC-ENTMCNC: 32.3 GM/DL
MCV RBC AUTO: 97.8 FL
MONOCYTES # BLD AUTO: 0.39 K/UL
MONOCYTES # BLD AUTO: 0.39 K/UL
MONOCYTES NFR BLD AUTO: 4.5 %
MONOCYTES NFR BLD AUTO: 4.5 %
MSMEAR: NORMAL
NEUTROPHILS # BLD AUTO: 6.97 K/UL
NEUTROPHILS # BLD AUTO: 6.97 K/UL
NEUTROPHILS NFR BLD AUTO: 81.2 %
NEUTROPHILS NFR BLD AUTO: 81.2 %
OVALOCYTES BLD QL SMEAR: SLIGHT
PLAT MORPH BLD: ABNORMAL
PLATELET # BLD AUTO: 594 K/UL
POIKILOCYTOSIS BLD QL SMEAR: SLIGHT
POLYCHROMASIA BLD QL SMEAR: SLIGHT
POTASSIUM SERPL-SCNC: 5.5 MMOL/L
POTASSIUM SERPL-SCNC: 6 MMOL/L
PROT SERPL-MCNC: 6.8 G/DL
RBC # BLD: 4.09 M/UL
RBC # FLD: 18.3 %
RBC BLD AUTO: ABNORMAL
SCHISTOCYTES BLD QL SMEAR: SLIGHT
SODIUM SERPL-SCNC: 131 MMOL/L
SPHEROCYTES BLD QL SMEAR: SLIGHT
URATE SERPL-MCNC: 5.4 MG/DL
WBC # FLD AUTO: 8.58 K/UL

## 2024-03-21 ENCOUNTER — APPOINTMENT (OUTPATIENT)
Dept: HEMATOLOGY ONCOLOGY | Facility: CLINIC | Age: 84
End: 2024-03-21
Payer: MEDICARE

## 2024-03-21 ENCOUNTER — LABORATORY RESULT (OUTPATIENT)
Age: 84
End: 2024-03-21

## 2024-03-21 VITALS
SYSTOLIC BLOOD PRESSURE: 130 MMHG | OXYGEN SATURATION: 98 % | HEART RATE: 71 BPM | TEMPERATURE: 97.3 F | BODY MASS INDEX: 25.76 KG/M2 | DIASTOLIC BLOOD PRESSURE: 78 MMHG | WEIGHT: 170 LBS | HEIGHT: 68 IN

## 2024-03-21 PROCEDURE — 36415 COLL VENOUS BLD VENIPUNCTURE: CPT

## 2024-03-21 PROCEDURE — 99214 OFFICE O/P EST MOD 30 MIN: CPT

## 2024-03-21 PROCEDURE — G2211 COMPLEX E/M VISIT ADD ON: CPT

## 2024-03-21 RX ORDER — HYDROXYUREA 500 MG/1
500 CAPSULE ORAL
Qty: 160 | Refills: 2 | Status: ACTIVE | COMMUNITY
Start: 2024-01-09

## 2024-03-21 NOTE — REVIEW OF SYSTEMS
[Fatigue] : fatigue [Constipation] : constipation [Joint Pain] : joint pain [Joint Stiffness] : joint stiffness [Negative] : Allergic/Immunologic [Chills] : no chills [Fever] : no fever [Recent Change In Weight] : ~T no recent weight change [Night Sweats] : no night sweats [Nosebleeds] : no nosebleeds [Vision Problems] : no vision problems [Shortness Of Breath] : no shortness of breath [Skin Rash] : no skin rash [Dizziness] : no dizziness [Muscle Weakness] : no muscle weakness [Easy Bruising] : no tendency for easy bruising [Easy Bleeding] : no tendency for easy bleeding [Swollen Glands] : no swollen glands [FreeTextEntry7] : Constipation tolerable with prunes and metamucil [FreeTextEntry9] : Generalized aches and pains from arthritis

## 2024-03-21 NOTE — REASON FOR VISIT
[Follow-Up Visit] : a follow-up visit for [FreeTextEntry2] : Myeloproliferative disorder, elevated hemoglobin, elevated hematocrit, thrombocytosis, MGUS

## 2024-03-21 NOTE — HISTORY OF PRESENT ILLNESS
[de-identified] : Patient is an 83 year old female with a history of hypertension, MGUS (first diagnosed in 2014), hyperlipidemia, arthritis, chronic back problems and thrombocytosis, diagnosed with JAK2 positive thrombocytosis/myeloproliferative disorder who returns for follow up. Patient states that she was first made aware of an elevated platelet count earlier in 2023. At the last visit in January, the CBC was significant for platelet count that was still somewhat elevated at 594,000, normal hemoglobin at 12.9 and a white count of 8.58. Comprehensive metabolic panel was significant for a sodium that was low at 131 and a potassium was 6.0. However plasma potassium was only slightly elevated at 5.5. Beta-2 microglobulin was 4.5. CRP, haptoglobin, LDH, uric acid, ionized calcium, and sed rate were normal. Patient is on hydroxyurea 1000 mg 4 times a week and 500 mg 3 times a week. Since the last visit, patient saw her PCP, Dr. La and her platelet count was still elevated at that time. Patient lost 4 pounds since the last visit. Today, the patient feels fatigued and complains of generalized aches and pains due to arthritis. Patient takes prunes and Metamucil for constipation relief. Denies bruising excessively, bleeding tendencies (hematuria, hematochezia, epistaxis, gingival bleeding), fever, chills, drenching night sweats, other headaches, chest pain, abdominal pain, dizziness, shortness of breath, vision changes, unintentional weight loss or recent/frequent infections.

## 2024-03-21 NOTE — ASSESSMENT
[FreeTextEntry1] : Patient is an 83 year old female with a history of hypertension, stable MGUS (first diagnosed in 2014), hyperlipidemia arthritis, chronic back problems, and JAK2 positive myeloproliferative disorder, confirmed by bone marrow aspirate and biopsy, maintained on hydroxyurea who returns for follow up.  Have ordered B2MG, ionized calcium, CBC with auto differential, CMP, CRP, ferritin, haptoglobin, Immunoelectrophoresis, immunofixation, immunoglobulin FLC, immunoglobulins panel, iron panel, LDH, manual differential, plasma potassium, protein electrophoresis, reticulocyte count, sed rate, uric acid and Viscosity. Venipuncture was performed in the office today. Patient was advised to call to discuss results and further management.

## 2024-03-21 NOTE — PHYSICAL EXAM
[Normal] : affect appropriate [de-identified] : no mucosal bleeding [de-identified] : Rare ectopic [de-identified] : LE varicosities, trace L ankle edema [de-identified] : Mild tenderness lumbar spine [de-identified] : Full ROM, arthritic deformities in hands, left ankle swelling

## 2024-03-22 LAB
ACANTHOCYTES BLD QL SMEAR: SLIGHT
ALBUMIN SERPL ELPH-MCNC: 4.4 G/DL
ALP BLD-CCNC: 85 U/L
ALT SERPL-CCNC: 19 U/L
ANION GAP SERPL CALC-SCNC: 16 MMOL/L
ANISOCYTOSIS BLD QL: SLIGHT
AST SERPL-CCNC: 23 U/L
B2 MICROGLOB SERPL-MCNC: 2.8 MG/L
BASOPHILS NFR BLD AUTO: 1 %
BASOPHILS NFR BLD AUTO: 1 %
BILIRUB SERPL-MCNC: 0.4 MG/DL
BUN SERPL-MCNC: 20 MG/DL
BURR CELLS BLD QL SMEAR: PRESENT
CALCIUM SERPL-MCNC: 9 MG/DL
CHLORIDE SERPL-SCNC: 97 MMOL/L
CO2 SERPL-SCNC: 19 MMOL/L
CREAT SERPL-MCNC: 0.78 MG/DL
CRP SERPL-MCNC: <3 MG/L
EGFR: 76 ML/MIN/1.73M2
EOSINOPHIL NFR BLD AUTO: 4 %
EOSINOPHIL NFR BLD AUTO: 4 %
ERYTHROCYTE [SEDIMENTATION RATE] IN BLOOD BY WESTERGREN METHOD: 2 MM/HR
FERRITIN SERPL-MCNC: 21 NG/ML
GLUCOSE SERPL-MCNC: 91 MG/DL
HAPTOGLOB SERPL-MCNC: 62 MG/DL
HCT VFR BLD CALC: 43.4 %
HGB BLD-MCNC: 13.6 G/DL
HYPOCHROMIA BLD QL SMEAR: SLIGHT
IRON SATN MFR SERPL: 29 %
IRON SERPL-MCNC: 98 UG/DL
LDH SERPL-CCNC: 219 U/L
LYMPHOCYTES NFR BLD AUTO: 13 %
LYMPHOCYTES NFR BLD AUTO: 13 %
MACROCYTES BLD QL SMEAR: SLIGHT
MAN DIFF?: YES
MCHC RBC-ENTMCNC: 31.3 GM/DL
MCHC RBC-ENTMCNC: 33.5 PG
MCV RBC AUTO: 106.9 FL
MONOCYTES NFR BLD AUTO: 4 %
MONOCYTES NFR BLD AUTO: 4 %
MSMEAR: NORMAL
NEUTROPHILS NFR BLD AUTO: 77 %
NEUTROPHILS NFR BLD AUTO: 77 %
OVALOCYTES BLD QL SMEAR: SLIGHT
PLAT MORPH BLD: ABNORMAL
PLATELET # BLD AUTO: 465 K/UL
POIKILOCYTOSIS BLD QL SMEAR: SLIGHT
POLYCHROMASIA BLD QL SMEAR: SLIGHT
POTASSIUM SERPL-SCNC: 4.9 MMOL/L
POTASSIUM SERPL-SCNC: 5.2 MMOL/L
PROT SERPL-MCNC: 7.3 G/DL
RBC # BLD: 4.06 M/UL
RBC # BLD: 4.06 M/UL
RBC # FLD: 14.8 %
RBC BLD AUTO: ABNORMAL
RETICS # AUTO: 2 %
RETICS AGGREG/RBC NFR: 82 K/UL
SCHISTOCYTES BLD QL SMEAR: SLIGHT
SODIUM SERPL-SCNC: 132 MMOL/L
SPHEROCYTES BLD QL SMEAR: SLIGHT
TIBC SERPL-MCNC: 340 UG/DL
UIBC SERPL-MCNC: 242 UG/DL
URATE SERPL-MCNC: 4.5 MG/DL
WBC # FLD AUTO: 7.11 K/UL

## 2024-03-24 LAB — CA-I SERPL-SCNC: 4.9 MG/DL

## 2024-03-26 ENCOUNTER — NON-APPOINTMENT (OUTPATIENT)
Age: 84
End: 2024-03-26

## 2024-03-26 LAB
ALBUMIN MFR SERPL ELPH: 62.5 %
ALBUMIN SERPL-MCNC: 4.6 G/DL
ALBUMIN/GLOB SERPL: 1.7 RATIO
ALPHA1 GLOB MFR SERPL ELPH: 2.4 %
ALPHA1 GLOB SERPL ELPH-MCNC: 0.2 G/DL
ALPHA2 GLOB MFR SERPL ELPH: 7.8 %
ALPHA2 GLOB SERPL ELPH-MCNC: 0.6 G/DL
B-GLOBULIN MFR SERPL ELPH: 8.5 %
B-GLOBULIN SERPL ELPH-MCNC: 0.6 G/DL
DEPRECATED KAPPA LC FREE/LAMBDA SER: 1.37 RATIO
GAMMA GLOB FLD ELPH-MCNC: 1.4 G/DL
GAMMA GLOB MFR SERPL ELPH: 18.8 %
IGA SER QL IEP: 53 MG/DL
IGG SER QL IEP: 1485 MG/DL
IGM SER QL IEP: 38 MG/DL
INTERPRETATION SERPL IEP-IMP: NORMAL
KAPPA LC CSF-MCNC: 1.23 MG/DL
KAPPA LC SERPL-MCNC: 1.69 MG/DL
M PROTEIN MFR SERPL ELPH: 12.8 %
M PROTEIN SPEC IFE-MCNC: NORMAL
MONOCLON BAND OBS SERPL: 0.9 G/DL
PROT SERPL-MCNC: 7.3 G/DL
PROT SERPL-MCNC: 7.3 G/DL

## 2024-03-27 LAB — VISCOSITY, SERUM: 1.6

## 2024-05-23 ENCOUNTER — APPOINTMENT (OUTPATIENT)
Dept: HEMATOLOGY ONCOLOGY | Facility: CLINIC | Age: 84
End: 2024-05-23
Payer: MEDICARE

## 2024-05-23 VITALS
HEIGHT: 68 IN | SYSTOLIC BLOOD PRESSURE: 132 MMHG | OXYGEN SATURATION: 98 % | HEART RATE: 87 BPM | WEIGHT: 179 LBS | TEMPERATURE: 98 F | DIASTOLIC BLOOD PRESSURE: 68 MMHG | BODY MASS INDEX: 27.13 KG/M2

## 2024-05-23 DIAGNOSIS — D72.829 ELEVATED WHITE BLOOD CELL COUNT, UNSPECIFIED: ICD-10-CM

## 2024-05-23 DIAGNOSIS — D47.1 CHRONIC MYELOPROLIFERATIVE DISEASE: ICD-10-CM

## 2024-05-23 DIAGNOSIS — D47.2 MONOCLONAL GAMMOPATHY: ICD-10-CM

## 2024-05-23 DIAGNOSIS — D58.2 OTHER HEMOGLOBINOPATHIES: ICD-10-CM

## 2024-05-23 DIAGNOSIS — D75.839 THROMBOCYTOSIS, UNSPECIFIED: ICD-10-CM

## 2024-05-23 DIAGNOSIS — Z15.89 GENETIC SUSCEPTIBILITY TO OTHER DISEASE: ICD-10-CM

## 2024-05-23 DIAGNOSIS — R71.8 OTHER ABNORMALITY OF RED BLOOD CELLS: ICD-10-CM

## 2024-05-23 PROCEDURE — 99214 OFFICE O/P EST MOD 30 MIN: CPT

## 2024-05-23 PROCEDURE — 36415 COLL VENOUS BLD VENIPUNCTURE: CPT

## 2024-05-23 PROCEDURE — G2211 COMPLEX E/M VISIT ADD ON: CPT

## 2024-05-23 NOTE — PHYSICAL EXAM
[Normal] : affect appropriate [de-identified] : no mucosal bleeding [de-identified] : Rare ectopic [de-identified] : LE varicosities, trace bilat ankle edema [de-identified] : Mild tenderness paraspinal muscles lumbar spine [de-identified] : Full ROM, arthritic deformities in hands, left ankle swelling

## 2024-05-23 NOTE — REVIEW OF SYSTEMS
[Fatigue] : fatigue [Recent Change In Weight] : ~T recent weight change [Constipation] : constipation [Negative] : Allergic/Immunologic [Fever] : no fever [Chills] : no chills [Night Sweats] : no night sweats [Vision Problems] : no vision problems [Nosebleeds] : no nosebleeds [Chest Pain] : no chest pain [Palpitations] : no palpitations [Shortness Of Breath] : no shortness of breath [Abdominal Pain] : no abdominal pain [Joint Pain] : no joint pain [Joint Stiffness] : no joint stiffness [Skin Rash] : no skin rash [Dizziness] : no dizziness [Muscle Weakness] : no muscle weakness [Easy Bleeding] : no tendency for easy bleeding [Easy Bruising] : no tendency for easy bruising [FreeTextEntry2] : Sleeps poorly, gained several pounds [FreeTextEntry7] : Constipation and weight gain attributed to Hydroxyurea [de-identified] : No headaches

## 2024-05-23 NOTE — HISTORY OF PRESENT ILLNESS
[de-identified] : Patient is an 83 year old female with a history of hypertension, stable MGUS (first diagnosed in 2014), hyperlipidemia, arthritis, chronic back problems and thrombocytosis, diagnosed with JAK2 positive thrombocytosis/myeloproliferative disorder who returns for follow up. Patient states that she was first made aware of an elevated platelet count earlier in 2023. At the last visit in March, the CBC was significant for a white count of 7.11, hemoglobin 13.6 and a better platelet count of 465,000. The MCV was 106.9 due to the hydroxyurea effect. Immunoelectrophoresis revealed a very stable M spike with normal amounts of IgG and IgM and slightly diminished IgA. 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 Comprehensive metabolic panel was significant for a sodium of 132. All other parameters including plasma potassium, ionized calcium, iron panel, C-reactive protein, LDH, uric acid, beta-2 microglobulin and sed rate were either normal or stable. She has gained 9 pounds since the last visit. Today, the patient feels fatigued attributed to sleeping poorly and complains of constipation since taking hydroxyurea but is otherwise feeling generally well. She continues to take prunes and Metamucil for constipation relief. Patient remains on hydroxyurea 1000 mg 4 times a week and 500 mg 3 times a week. Denies bruising excessively, bleeding tendencies (hematuria, hematochezia, epistaxis, gingival bleeding), fever, chills, drenching night sweats, other headaches, chest pain, abdominal pain, dizziness, shortness of breath, vision changes, unintentional weight loss or recent/frequent infections.

## 2024-05-23 NOTE — REASON FOR VISIT
[Follow-Up Visit] : a follow-up visit for [FreeTextEntry2] : Myeloproliferative disorder, positive JAK2 mutation, elevated hematocrit, elevated hemoglobin, thrombocytosis, leukocytosis, MGUS

## 2024-05-23 NOTE — ASSESSMENT
[FreeTextEntry1] : Patient is an 83 year old female with a history of hypertension, stable MGUS (first diagnosed in 2014), hyperlipidemia arthritis, chronic back problems, and JAK2 positive myeloproliferative disorder, confirmed by bone marrow aspirate and biopsy, maintained on hydroxyurea who returns for follow up. Have ordered B12 and folate, B2MG, ionized calcium, CBC with auto differential, CMP, CRP, ferritin, haptoglobin, iron panel, LDH, manual differential, plasma potassium, reticulocyte count, sed rate, uric acid and Viscosity. Venipuncture was performed in the office today. Patient was advised to call to discuss results and further management. Pending results, patient will return in 2 months.

## 2024-05-24 LAB
ALBUMIN SERPL ELPH-MCNC: 4.3 G/DL
ALP BLD-CCNC: 75 U/L
ALT SERPL-CCNC: 18 U/L
ANION GAP SERPL CALC-SCNC: 12 MMOL/L
ANISOCYTOSIS BLD QL: SLIGHT
AST SERPL-CCNC: 21 U/L
B2 MICROGLOB SERPL-MCNC: 3.7 MG/L
BASOPHILS NFR BLD AUTO: 2 %
BASOPHILS NFR BLD AUTO: 2 %
BILIRUB SERPL-MCNC: 0.4 MG/DL
BUN SERPL-MCNC: 52 MG/DL
CALCIUM SERPL-MCNC: 9.4 MG/DL
CHLORIDE SERPL-SCNC: 100 MMOL/L
CO2 SERPL-SCNC: 21 MMOL/L
CREAT SERPL-MCNC: 0.97 MG/DL
CRP SERPL-MCNC: <3 MG/L
EGFR: 58 ML/MIN/1.73M2
EOSINOPHIL NFR BLD AUTO: 1 %
EOSINOPHIL NFR BLD AUTO: 1 %
ERYTHROCYTE [SEDIMENTATION RATE] IN BLOOD BY WESTERGREN METHOD: 4 MM/HR
FERRITIN SERPL-MCNC: 35 NG/ML
FOLATE SERPL-MCNC: 16.8 NG/ML
GLUCOSE SERPL-MCNC: 109 MG/DL
HAPTOGLOB SERPL-MCNC: 57 MG/DL
HCT VFR BLD CALC: 35.3 %
HGB BLD-MCNC: 11.5 G/DL
HYPOCHROMIA BLD QL SMEAR: SLIGHT
IRON SATN MFR SERPL: 54 %
IRON SERPL-MCNC: 170 UG/DL
LDH SERPL-CCNC: 189 U/L
LYMPHOCYTES NFR BLD AUTO: 10 %
LYMPHOCYTES NFR BLD AUTO: 10 %
MACROCYTES BLD QL SMEAR: SLIGHT
MAN DIFF?: NORMAL
MCHC RBC-ENTMCNC: 32.6 GM/DL
MCHC RBC-ENTMCNC: 34.5 PG
MCV RBC AUTO: 106 FL
MONOCYTES NFR BLD AUTO: 6 %
MONOCYTES NFR BLD AUTO: 6 %
NEUTROPHILS NFR BLD AUTO: 81 %
NEUTROPHILS NFR BLD AUTO: 81 %
PLAT MORPH BLD: ABNORMAL
PLATELET # BLD AUTO: 448 K/UL
POLYCHROMASIA BLD QL SMEAR: SLIGHT
POTASSIUM SERPL-SCNC: 4.9 MMOL/L
POTASSIUM SERPL-SCNC: 5.5 MMOL/L
PROT SERPL-MCNC: 6.6 G/DL
RBC # BLD: 3.33 M/UL
RBC # BLD: 3.33 M/UL
RBC # FLD: 14.6 %
RBC BLD AUTO: ABNORMAL
RETICS # AUTO: 2.3 %
RETICS AGGREG/RBC NFR: 76.3 K/UL
SODIUM SERPL-SCNC: 133 MMOL/L
TIBC SERPL-MCNC: 312 UG/DL
UIBC SERPL-MCNC: 142 UG/DL
URATE SERPL-MCNC: 4.9 MG/DL
VIT B12 SERPL-MCNC: 319 PG/ML
WBC # FLD AUTO: 8.12 K/UL

## 2024-05-26 LAB — CA-I SERPL-SCNC: 5 MG/DL

## 2024-05-29 ENCOUNTER — NON-APPOINTMENT (OUTPATIENT)
Age: 84
End: 2024-05-29

## 2024-07-23 ENCOUNTER — APPOINTMENT (OUTPATIENT)
Dept: HEMATOLOGY ONCOLOGY | Facility: CLINIC | Age: 84
End: 2024-07-23
Payer: MEDICARE

## 2024-07-23 VITALS
BODY MASS INDEX: 26.67 KG/M2 | HEART RATE: 65 BPM | DIASTOLIC BLOOD PRESSURE: 62 MMHG | HEIGHT: 68 IN | SYSTOLIC BLOOD PRESSURE: 126 MMHG | TEMPERATURE: 97.9 F | WEIGHT: 176 LBS | OXYGEN SATURATION: 98 %

## 2024-07-23 DIAGNOSIS — D47.1 CHRONIC MYELOPROLIFERATIVE DISEASE: ICD-10-CM

## 2024-07-23 DIAGNOSIS — D72.829 ELEVATED WHITE BLOOD CELL COUNT, UNSPECIFIED: ICD-10-CM

## 2024-07-23 DIAGNOSIS — Z15.89 GENETIC SUSCEPTIBILITY TO OTHER DISEASE: ICD-10-CM

## 2024-07-23 DIAGNOSIS — R71.8 OTHER ABNORMALITY OF RED BLOOD CELLS: ICD-10-CM

## 2024-07-23 DIAGNOSIS — D58.2 OTHER HEMOGLOBINOPATHIES: ICD-10-CM

## 2024-07-23 DIAGNOSIS — D75.839 THROMBOCYTOSIS, UNSPECIFIED: ICD-10-CM

## 2024-07-23 LAB
RBC # BLD: 2.99 M/UL
RETICS # AUTO: 2.8 %
RETICS AGGREG/RBC NFR: 82.2 K/UL

## 2024-07-23 PROCEDURE — 36415 COLL VENOUS BLD VENIPUNCTURE: CPT

## 2024-07-23 PROCEDURE — G2211 COMPLEX E/M VISIT ADD ON: CPT

## 2024-07-23 PROCEDURE — 99214 OFFICE O/P EST MOD 30 MIN: CPT

## 2024-07-23 NOTE — ASSESSMENT
[FreeTextEntry1] : Patient is an 84 year old female with a history of hypertension, stable MGUS (first diagnosed in 2014), hyperlipidemia arthritis, chronic back problems, and JAK2 positive myeloproliferative disorder, confirmed by bone marrow aspirate and biopsy, maintained on hydroxyurea who returns for follow up.She has been supplementing for a low B12 level. Have ordered B12 and folate, B2MG, ionized calcium, CBC with auto differential, CMP, CRP, ferritin, haptoglobin, iron panel, LDH, manual differential, plasma potassium, reticulocyte count, sed rate and uric acid. Venipuncture was performed in the office today. Patient was advised to call to discuss results and further management. Pending results, patient will return in 2 months.

## 2024-07-23 NOTE — REVIEW OF SYSTEMS
[Vision Problems] : vision problems [Constipation] : constipation [Joint Pain] : joint pain [Joint Stiffness] : joint stiffness [Negative] : Allergic/Immunologic [Loss of Hearing] : no loss of hearing [Nosebleeds] : no nosebleeds [Chest Pain] : no chest pain [Palpitations] : no palpitations [Abdominal Pain] : no abdominal pain [Dysuria] : no dysuria [Skin Rash] : no skin rash [Dizziness] : no dizziness [Easy Bleeding] : no tendency for easy bleeding [FreeTextEntry3] : Right eye macular degeneration, recent injection [FreeTextEntry7] : Constipation from hydroxyurea [FreeTextEntry9] : Chronic low back pain, shoulders, knees

## 2024-07-23 NOTE — PHYSICAL EXAM
[Normal] : affect appropriate [de-identified] : no mucosal bleeding [de-identified] : LE varicosities, trace bilat ankle edema [de-identified] : Mild tenderness paraspinal muscles lumbar spine [de-identified] : Full ROM, arthritic deformities in hands, left ankle swelling

## 2024-07-23 NOTE — REASON FOR VISIT
[Follow-Up Visit] : a follow-up visit for [FreeTextEntry2] : Myeloproliferative disorder, thrombocytosis, positive JAK2 mutation, elevated hemoglobin, elevated hematocrit, leukocytosis, MGUS

## 2024-07-23 NOTE — PHYSICAL EXAM
[Normal] : affect appropriate [de-identified] : no mucosal bleeding [de-identified] : LE varicosities, trace bilat ankle edema [de-identified] : Mild tenderness paraspinal muscles lumbar spine [de-identified] : Full ROM, arthritic deformities in hands, left ankle swelling

## 2024-07-24 LAB
ALBUMIN SERPL ELPH-MCNC: 4 G/DL
ALP BLD-CCNC: 82 U/L
ALT SERPL-CCNC: 23 U/L
ANION GAP SERPL CALC-SCNC: 13 MMOL/L
ANISOCYTOSIS BLD QL: NORMAL
AST SERPL-CCNC: 29 U/L
B2 MICROGLOB SERPL-MCNC: 6.3 MG/L
BILIRUB SERPL-MCNC: 0.3 MG/DL
BUN SERPL-MCNC: 17 MG/DL
CALCIUM SERPL-MCNC: 8.9 MG/DL
CHLORIDE SERPL-SCNC: 98 MMOL/L
CO2 SERPL-SCNC: 22 MMOL/L
CREAT SERPL-MCNC: 1.03 MG/DL
CRP SERPL-MCNC: 4 MG/L
EGFR: 54 ML/MIN/1.73M2
EOSINOPHIL NFR BLD AUTO: 1 %
EOSINOPHIL NFR BLD AUTO: 1 %
ERYTHROCYTE [SEDIMENTATION RATE] IN BLOOD BY WESTERGREN METHOD: 5 MM/HR
FERRITIN SERPL-MCNC: 22 NG/ML
FOLATE SERPL-MCNC: >20 NG/ML
GIANT PLATELETS BLD QL SMEAR: PRESENT
GLUCOSE SERPL-MCNC: 101 MG/DL
HAPTOGLOB SERPL-MCNC: 87 MG/DL
HCT VFR BLD CALC: 33 %
HGB BLD-MCNC: 10.3 G/DL
HYPOCHROMIA BLD QL SMEAR: SLIGHT
IRON SATN MFR SERPL: 10 %
IRON SERPL-MCNC: 33 UG/DL
LDH SERPL-CCNC: 198 U/L
LYMPHOCYTES # BLD AUTO: 0.57 K/UL
LYMPHOCYTES # BLD AUTO: 0.57 K/UL
LYMPHOCYTES NFR BLD AUTO: 13 %
LYMPHOCYTES NFR BLD AUTO: 13 %
MACROCYTES BLD QL SMEAR: NORMAL
MAN DIFF?: NORMAL
MCHC RBC-ENTMCNC: 31.2 GM/DL
MCHC RBC-ENTMCNC: 34.4 PG
MCV RBC AUTO: 110.4 FL
MONOCYTES NFR BLD AUTO: 8 %
MONOCYTES NFR BLD AUTO: 8 %
MSMEAR: NORMAL
NEUTROPHILS # BLD AUTO: 3.45 K/UL
NEUTROPHILS # BLD AUTO: 3.45 K/UL
NEUTROPHILS NFR BLD AUTO: 78 %
NEUTROPHILS NFR BLD AUTO: 78 %
OVALOCYTES BLD QL SMEAR: SLIGHT
PLAT MORPH BLD: NORMAL
PLATELET # BLD AUTO: 355 K/UL
POIKILOCYTOSIS BLD QL SMEAR: SLIGHT
POLYCHROMASIA BLD QL SMEAR: SLIGHT
POTASSIUM SERPL-SCNC: 4.8 MMOL/L
POTASSIUM SERPL-SCNC: 5.1 MMOL/L
PROT SERPL-MCNC: 6.7 G/DL
RBC # BLD: 2.99 M/UL
RBC # FLD: 14.6 %
RBC BLD AUTO: ABNORMAL
SCHISTOCYTES BLD QL SMEAR: SLIGHT
SODIUM SERPL-SCNC: 132 MMOL/L
TIBC SERPL-MCNC: 342 UG/DL
UIBC SERPL-MCNC: 309 UG/DL
URATE SERPL-MCNC: 4.8 MG/DL
VIT B12 SERPL-MCNC: 543 PG/ML
WBC # FLD AUTO: 4.4 K/UL

## 2024-07-25 LAB — CA-I SERPL-SCNC: 4.7 MG/DL

## 2024-07-30 ENCOUNTER — NON-APPOINTMENT (OUTPATIENT)
Age: 84
End: 2024-07-30

## 2024-09-04 ENCOUNTER — APPOINTMENT (OUTPATIENT)
Dept: HEMATOLOGY ONCOLOGY | Facility: CLINIC | Age: 84
End: 2024-09-04
Payer: MEDICARE

## 2024-09-04 VITALS
DIASTOLIC BLOOD PRESSURE: 77 MMHG | HEART RATE: 66 BPM | WEIGHT: 181 LBS | TEMPERATURE: 97.3 F | OXYGEN SATURATION: 97 % | SYSTOLIC BLOOD PRESSURE: 122 MMHG | HEIGHT: 68 IN | BODY MASS INDEX: 27.43 KG/M2

## 2024-09-04 DIAGNOSIS — Z15.89 GENETIC SUSCEPTIBILITY TO OTHER DISEASE: ICD-10-CM

## 2024-09-04 DIAGNOSIS — D47.2 MONOCLONAL GAMMOPATHY: ICD-10-CM

## 2024-09-04 DIAGNOSIS — D75.839 THROMBOCYTOSIS, UNSPECIFIED: ICD-10-CM

## 2024-09-04 DIAGNOSIS — D47.1 CHRONIC MYELOPROLIFERATIVE DISEASE: ICD-10-CM

## 2024-09-04 DIAGNOSIS — D72.829 ELEVATED WHITE BLOOD CELL COUNT, UNSPECIFIED: ICD-10-CM

## 2024-09-04 DIAGNOSIS — D50.9 IRON DEFICIENCY ANEMIA, UNSPECIFIED: ICD-10-CM

## 2024-09-04 PROCEDURE — 36415 COLL VENOUS BLD VENIPUNCTURE: CPT

## 2024-09-04 PROCEDURE — 99214 OFFICE O/P EST MOD 30 MIN: CPT

## 2024-09-04 PROCEDURE — G2211 COMPLEX E/M VISIT ADD ON: CPT

## 2024-09-04 RX ORDER — IRON BIS-GLYCINAT/VIT C/FA/B12 28-60-0.4
CAPSULE ORAL
Refills: 0 | Status: ACTIVE | COMMUNITY

## 2024-09-04 NOTE — REASON FOR VISIT
[Follow-Up Visit] : a follow-up visit for [FreeTextEntry2] : Myeloproliferative disorder, thrombocytosis, JAK2 mutation, leukocytosis, iron deficiency anemia

## 2024-09-04 NOTE — PHYSICAL EXAM
[Normal] : affect appropriate [de-identified] : no mucosal bleeding [de-identified] : LE varicosities, trace bilat ankle edema [de-identified] : Mild tenderness paraspinal muscles lumbar spine [de-identified] : Full ROM, arthritic deformities in hands, left ankle swelling

## 2024-09-04 NOTE — HISTORY OF PRESENT ILLNESS
[de-identified] : Patient is an 84 year old female with a history of hypertension, stable MGUS (first diagnosed in 2014), hyperlipidemia, arthritis, chronic back problems and thrombocytosis, diagnosed with JAK2 positive thrombocytosis/myeloproliferative disorder who returns for follow up. Patient states that she was first made aware of an elevated platelet count earlier in 2023. At the last visit in July, the CBC was significant for an even lower hemoglobin at 10.3 and a hematocrit of 33.0. This has been a downward trend. One year ago patient had elevated hemoglobin and hematocrit. White count was normal at 4.40 and platelet count was stable at 355,000. MCV was 110.4 due to the effects of hydroxyurea. Iron panel was significant now for a low saturation at 10% and ferritin was low at 22. B12 was improved at 543 and folate was greater than 20. LDH, uric acid, sed rate, haptoglobin, plasma potassium and C-reactive protein were normal. Beta-2 microglobulin was 6.3. CMP was remarkable for patient's chronic low sodium at 132, otherwise unremarkable. Patient has a history of colonic polyps and had her last colonoscopy in November of 2023, where she said 3 areas were cauterized and that there was no need for further colonoscopies. She has gained 5 pounds since the last visit. Today, the patient feels fatigued at times and complains of joint pain and stiffness. She is otherwise feeling generally well. Patient remains on hydroxyurea 1000 mg 4 times a week and 500 mg 3 times a week. She is supplementing quick dissolve B12 500 mcg 2-3 times per week and gentle iron with vitamin C which she tolerates well. Denies dizziness, skin rashes, bruising excessively, fever, chills, drenching night sweats, chest pain, shortness of breath, unintentional weight loss or recent/frequent infections.

## 2024-09-04 NOTE — REVIEW OF SYSTEMS
[Fatigue] : fatigue [Joint Pain] : joint pain [Joint Stiffness] : joint stiffness [Negative] : Allergic/Immunologic [Fever] : no fever [Chills] : no chills [Night Sweats] : no night sweats [Recent Change In Weight] : ~T no recent weight change [Eye Pain] : no eye pain [Vision Problems] : no vision problems [Nosebleeds] : no nosebleeds [Chest Pain] : no chest pain [Palpitations] : no palpitations [Shortness Of Breath] : no shortness of breath [Abdominal Pain] : no abdominal pain [Skin Rash] : no skin rash [Dizziness] : no dizziness [Muscle Weakness] : no muscle weakness [Easy Bleeding] : no tendency for easy bleeding [Easy Bruising] : no tendency for easy bruising [Swollen Glands] : no swollen glands

## 2024-09-04 NOTE — ASSESSMENT
[FreeTextEntry1] : Patient is an 84 year old female with a history of hypertension, stable MGUS (first diagnosed in 2014), hyperlipidemia arthritis, chronic back problems, and JAK2 positive myeloproliferative disorder, confirmed by bone marrow aspirate and biopsy, maintained on hydroxyurea who returns for follow up. She has been supplementing Gentle iron for a new iron deficiency anemia and B12 for a low B12 level. Have ordered B12 and folate, B2MG, ionized calcium, CBC with auto differential, CMP, CRP, ferritin, haptoglobin, iron panel, LDH, manual differential, plasma potassium, reticulocyte count, sed rate and uric acid. Venipuncture was performed in the office today. Patient was advised to call to discuss results and further management. Pending results, patient will return in 2-3 months.

## 2024-09-05 LAB
ALBUMIN SERPL ELPH-MCNC: 4.5 G/DL
ALP BLD-CCNC: 99 U/L
ALT SERPL-CCNC: 18 U/L
ANION GAP SERPL CALC-SCNC: 13 MMOL/L
ANISOCYTOSIS BLD QL: SLIGHT
AST SERPL-CCNC: 24 U/L
B2 MICROGLOB SERPL-MCNC: 2.8 MG/L
BASOPHILS NFR BLD AUTO: 1 %
BASOPHILS NFR BLD AUTO: 1 %
BILIRUB SERPL-MCNC: 0.4 MG/DL
BUN SERPL-MCNC: 14 MG/DL
CALCIUM SERPL-MCNC: 9.4 MG/DL
CHLORIDE SERPL-SCNC: 97 MMOL/L
CO2 SERPL-SCNC: 23 MMOL/L
CREAT SERPL-MCNC: 0.76 MG/DL
CRP SERPL-MCNC: <3 MG/L
EGFR: 78 ML/MIN/1.73M2
EOSINOPHIL NFR BLD AUTO: 1 %
EOSINOPHIL NFR BLD AUTO: 1 %
ERYTHROCYTE [SEDIMENTATION RATE] IN BLOOD BY WESTERGREN METHOD: 2 MM/HR
FERRITIN SERPL-MCNC: 26 NG/ML
FOLATE SERPL-MCNC: >20 NG/ML
GIANT PLATELETS BLD QL SMEAR: PRESENT
GLUCOSE SERPL-MCNC: 91 MG/DL
HAPTOGLOB SERPL-MCNC: 56 MG/DL
HCT VFR BLD CALC: 39 %
HGB BLD-MCNC: 12.9 G/DL
HYPOCHROMIA BLD QL SMEAR: SLIGHT
IRON SATN MFR SERPL: 32 %
IRON SERPL-MCNC: 111 UG/DL
LDH SERPL-CCNC: 242 U/L
LYMPHOCYTES # BLD AUTO: 1.05 K/UL
LYMPHOCYTES # BLD AUTO: 1.05 K/UL
LYMPHOCYTES NFR BLD AUTO: 18 %
LYMPHOCYTES NFR BLD AUTO: 18 %
MACROCYTES BLD QL SMEAR: SLIGHT
MAN DIFF?: NORMAL
MCHC RBC-ENTMCNC: 33.1 GM/DL
MCHC RBC-ENTMCNC: 35.9 PG
MCV RBC AUTO: 108.6 FL
MONOCYTES NFR BLD AUTO: 11 %
MONOCYTES NFR BLD AUTO: 11 %
MSMEAR: NORMAL
NEUTROPHILS # BLD AUTO: 4.2 K/UL
NEUTROPHILS # BLD AUTO: 4.2 K/UL
NEUTROPHILS NFR BLD AUTO: 70 %
NEUTROPHILS NFR BLD AUTO: 70 %
OVALOCYTES BLD QL SMEAR: SLIGHT
PLAT MORPH BLD: ABNORMAL
PLATELET # BLD AUTO: 324 K/UL
POIKILOCYTOSIS BLD QL SMEAR: SLIGHT
POLYCHROMASIA BLD QL SMEAR: SLIGHT
POTASSIUM SERPL-SCNC: 4.5 MMOL/L
POTASSIUM SERPL-SCNC: 4.6 MMOL/L
PROT SERPL-MCNC: 7.4 G/DL
RBC # BLD: 3.59 M/UL
RBC # BLD: 3.59 M/UL
RBC # FLD: 14.6 %
RBC BLD AUTO: ABNORMAL
RETICS # AUTO: 2.2 %
RETICS AGGREG/RBC NFR: 77.2 K/UL
SODIUM SERPL-SCNC: 133 MMOL/L
TIBC SERPL-MCNC: 343 UG/DL
UIBC SERPL-MCNC: 232 UG/DL
URATE SERPL-MCNC: 4.3 MG/DL
VIT B12 SERPL-MCNC: 526 PG/ML
WBC # FLD AUTO: 5.98 K/UL

## 2024-09-06 LAB — CA-I SERPL-SCNC: 4.9 MG/DL

## 2024-09-11 ENCOUNTER — NON-APPOINTMENT (OUTPATIENT)
Age: 84
End: 2024-09-11

## 2024-10-28 ENCOUNTER — APPOINTMENT (OUTPATIENT)
Dept: VASCULAR SURGERY | Facility: CLINIC | Age: 84
End: 2024-10-28
Payer: MEDICARE

## 2024-10-28 ENCOUNTER — NON-APPOINTMENT (OUTPATIENT)
Age: 84
End: 2024-10-28

## 2024-10-28 VITALS
TEMPERATURE: 97.3 F | HEART RATE: 80 BPM | BODY MASS INDEX: 26.67 KG/M2 | DIASTOLIC BLOOD PRESSURE: 81 MMHG | OXYGEN SATURATION: 99 % | SYSTOLIC BLOOD PRESSURE: 158 MMHG | WEIGHT: 176 LBS | HEIGHT: 68 IN

## 2024-10-28 DIAGNOSIS — I87.2 VENOUS INSUFFICIENCY (CHRONIC) (PERIPHERAL): ICD-10-CM

## 2024-10-28 DIAGNOSIS — I83.893 VARICOSE VEINS OF BILATERAL LOWER EXTREMITIES WITH OTHER COMPLICATIONS: ICD-10-CM

## 2024-10-28 DIAGNOSIS — M79.605 PAIN IN LEFT LEG: ICD-10-CM

## 2024-10-28 DIAGNOSIS — R25.2 CRAMP AND SPASM: ICD-10-CM

## 2024-10-28 DIAGNOSIS — M79.89 OTHER SPECIFIED SOFT TISSUE DISORDERS: ICD-10-CM

## 2024-10-28 DIAGNOSIS — I83.813 VARICOSE VEINS OF BILATERAL LOWER EXTREMITIES WITH PAIN: ICD-10-CM

## 2024-10-28 PROCEDURE — 99214 OFFICE O/P EST MOD 30 MIN: CPT | Mod: 25

## 2024-10-28 PROCEDURE — 93970 EXTREMITY STUDY: CPT

## 2024-11-07 ENCOUNTER — APPOINTMENT (OUTPATIENT)
Dept: VASCULAR SURGERY | Facility: CLINIC | Age: 84
End: 2024-11-07

## 2024-11-13 ENCOUNTER — APPOINTMENT (OUTPATIENT)
Dept: VASCULAR SURGERY | Facility: CLINIC | Age: 84
End: 2024-11-13

## 2024-11-20 ENCOUNTER — APPOINTMENT (OUTPATIENT)
Dept: VASCULAR SURGERY | Facility: CLINIC | Age: 84
End: 2024-11-20

## 2024-11-25 ENCOUNTER — APPOINTMENT (OUTPATIENT)
Dept: VASCULAR SURGERY | Facility: CLINIC | Age: 84
End: 2024-11-25

## 2024-12-02 ENCOUNTER — APPOINTMENT (OUTPATIENT)
Dept: HEMATOLOGY ONCOLOGY | Facility: CLINIC | Age: 84
End: 2024-12-02
Payer: MEDICARE

## 2024-12-02 VITALS
SYSTOLIC BLOOD PRESSURE: 138 MMHG | TEMPERATURE: 97.3 F | OXYGEN SATURATION: 98 % | BODY MASS INDEX: 27.28 KG/M2 | WEIGHT: 180 LBS | HEART RATE: 70 BPM | DIASTOLIC BLOOD PRESSURE: 78 MMHG | HEIGHT: 68 IN

## 2024-12-02 DIAGNOSIS — D75.839 THROMBOCYTOSIS, UNSPECIFIED: ICD-10-CM

## 2024-12-02 DIAGNOSIS — D47.1 CHRONIC MYELOPROLIFERATIVE DISEASE: ICD-10-CM

## 2024-12-02 DIAGNOSIS — Z15.89 GENETIC SUSCEPTIBILITY TO OTHER DISEASE: ICD-10-CM

## 2024-12-02 DIAGNOSIS — D47.2 MONOCLONAL GAMMOPATHY: ICD-10-CM

## 2024-12-02 DIAGNOSIS — D50.9 IRON DEFICIENCY ANEMIA, UNSPECIFIED: ICD-10-CM

## 2024-12-02 LAB
RBC # BLD: 3.83 M/UL
RETICS # AUTO: 1.8 %
RETICS AGGREG/RBC NFR: 69.7 K/UL

## 2024-12-02 PROCEDURE — G2211 COMPLEX E/M VISIT ADD ON: CPT

## 2024-12-02 PROCEDURE — 99214 OFFICE O/P EST MOD 30 MIN: CPT

## 2024-12-02 PROCEDURE — 36415 COLL VENOUS BLD VENIPUNCTURE: CPT

## 2024-12-03 LAB
ALBUMIN SERPL ELPH-MCNC: 4.3 G/DL
ALP BLD-CCNC: 88 U/L
ALT SERPL-CCNC: 19 U/L
ANION GAP SERPL CALC-SCNC: 14 MMOL/L
ANISOCYTOSIS BLD QL: SLIGHT
AST SERPL-CCNC: 23 U/L
B2 MICROGLOB SERPL-MCNC: 3.1 MG/L
BILIRUB SERPL-MCNC: 0.5 MG/DL
BUN SERPL-MCNC: 16 MG/DL
CALCIUM SERPL-MCNC: 9.3 MG/DL
CHLORIDE SERPL-SCNC: 99 MMOL/L
CO2 SERPL-SCNC: 24 MMOL/L
CREAT SERPL-MCNC: 0.75 MG/DL
CRP SERPL-MCNC: <3 MG/L
DACRYOCYTES BLD QL SMEAR: SLIGHT
EGFR: 79 ML/MIN/1.73M2
EOSINOPHIL NFR BLD AUTO: 3 %
EOSINOPHIL NFR BLD AUTO: 3 %
ERYTHROCYTE [SEDIMENTATION RATE] IN BLOOD BY WESTERGREN METHOD: 6 MM/HR
FERRITIN SERPL-MCNC: 73 NG/ML
FOLATE SERPL-MCNC: >20 NG/ML
GIANT PLATELETS BLD QL SMEAR: PRESENT
GLUCOSE SERPL-MCNC: 94 MG/DL
HAPTOGLOB SERPL-MCNC: 100 MG/DL
HCT VFR BLD CALC: 41.7 %
HGB BLD-MCNC: 13 G/DL
HYPOCHROMIA BLD QL SMEAR: SLIGHT
IRON SATN MFR SERPL: 32 %
IRON SERPL-MCNC: 92 UG/DL
LDH SERPL-CCNC: 225 U/L
LYMPHOCYTES # BLD AUTO: 0.8 K/UL
LYMPHOCYTES # BLD AUTO: 0.8 K/UL
LYMPHOCYTES NFR BLD AUTO: 13 %
LYMPHOCYTES NFR BLD AUTO: 13 %
MACROCYTES BLD QL SMEAR: SLIGHT
MAN DIFF?: NORMAL
MCHC RBC-ENTMCNC: 31.2 G/DL
MCHC RBC-ENTMCNC: 33.9 PG
MCV RBC AUTO: 108.9 FL
MONOCYTES NFR BLD AUTO: 5 %
MONOCYTES NFR BLD AUTO: 5 %
MSMEAR: NORMAL
NEUTROPHILS # BLD AUTO: 5.02 K/UL
NEUTROPHILS # BLD AUTO: 5.02 K/UL
NEUTROPHILS NFR BLD AUTO: 79 %
NEUTROPHILS NFR BLD AUTO: 79 %
OVALOCYTES BLD QL SMEAR: SLIGHT
PLAT MORPH BLD: ABNORMAL
PLATELET # BLD AUTO: 447 K/UL
POIKILOCYTOSIS BLD QL SMEAR: SLIGHT
POLYCHROMASIA BLD QL SMEAR: SLIGHT
POTASSIUM SERPL-SCNC: 4.3 MMOL/L
POTASSIUM SERPL-SCNC: 4.9 MMOL/L
PROT SERPL-MCNC: 7.1 G/DL
RBC # BLD: 3.83 M/UL
RBC # FLD: 13.1 %
RBC BLD AUTO: ABNORMAL
SCHISTOCYTES BLD QL SMEAR: SLIGHT
SODIUM SERPL-SCNC: 137 MMOL/L
SPHEROCYTES BLD QL SMEAR: SLIGHT
TIBC SERPL-MCNC: 291 UG/DL
UIBC SERPL-MCNC: 199 UG/DL
URATE SERPL-MCNC: 4.2 MG/DL
VIT B12 SERPL-MCNC: 723 PG/ML
WBC # FLD AUTO: 6.36 K/UL

## 2024-12-04 LAB — CA-I SERPL-SCNC: 4.9 MG/DL

## 2024-12-05 ENCOUNTER — APPOINTMENT (OUTPATIENT)
Dept: VASCULAR SURGERY | Facility: CLINIC | Age: 84
End: 2024-12-05
Payer: MEDICARE

## 2024-12-05 VITALS
SYSTOLIC BLOOD PRESSURE: 167 MMHG | HEART RATE: 79 BPM | TEMPERATURE: 96.8 F | DIASTOLIC BLOOD PRESSURE: 81 MMHG | OXYGEN SATURATION: 97 %

## 2024-12-05 DIAGNOSIS — I87.2 VENOUS INSUFFICIENCY (CHRONIC) (PERIPHERAL): ICD-10-CM

## 2024-12-05 PROBLEM — I83.812 VARICOSE VEINS OF LEFT LOWER EXTREMITY WITH PAIN: Status: ACTIVE | Noted: 2024-12-05

## 2024-12-05 PROBLEM — I83.892 SYMPTOMATIC VARICOSE VEINS OF LEFT LOWER EXTREMITY: Status: ACTIVE | Noted: 2024-12-05

## 2024-12-05 LAB
ALBUMIN MFR SERPL ELPH: 61 %
ALBUMIN SERPL-MCNC: 4.3 G/DL
ALBUMIN/GLOB SERPL: 1.5 RATIO
ALPHA1 GLOB MFR SERPL ELPH: 3 %
ALPHA1 GLOB SERPL ELPH-MCNC: 0.2 G/DL
ALPHA2 GLOB MFR SERPL ELPH: 8.7 %
ALPHA2 GLOB SERPL ELPH-MCNC: 0.6 G/DL
B-GLOBULIN MFR SERPL ELPH: 8.8 %
B-GLOBULIN SERPL ELPH-MCNC: 0.6 G/DL
DEPRECATED KAPPA LC FREE/LAMBDA SER: 1.2 RATIO
GAMMA GLOB FLD ELPH-MCNC: 1.3 G/DL
GAMMA GLOB MFR SERPL ELPH: 18.5 %
IGA SER QL IEP: 53 MG/DL
IGG SER QL IEP: 1105 MG/DL
IGM SER QL IEP: 32 MG/DL
INTERPRETATION SERPL IEP-IMP: NORMAL
KAPPA LC CSF-MCNC: 1.08 MG/DL
KAPPA LC SERPL-MCNC: 1.3 MG/DL
M PROTEIN MFR SERPL ELPH: 13.9 %
M PROTEIN SPEC IFE-MCNC: NORMAL
MONOCLON BAND OBS SERPL: 1 G/DL
PROT SERPL-MCNC: 7.1 G/DL
VISCOSITY, SERUM: 1.7

## 2024-12-05 PROCEDURE — 36475 ENDOVENOUS RF 1ST VEIN: CPT | Mod: LT

## 2024-12-12 ENCOUNTER — APPOINTMENT (OUTPATIENT)
Dept: VASCULAR SURGERY | Facility: CLINIC | Age: 84
End: 2024-12-12
Payer: MEDICARE

## 2024-12-12 VITALS
DIASTOLIC BLOOD PRESSURE: 81 MMHG | SYSTOLIC BLOOD PRESSURE: 157 MMHG | HEART RATE: 80 BPM | TEMPERATURE: 97.1 F | OXYGEN SATURATION: 99 %

## 2024-12-12 DIAGNOSIS — I83.812 VARICOSE VEINS OF LEFT LOWER EXTREMITY WITH PAIN: ICD-10-CM

## 2024-12-12 DIAGNOSIS — I83.892 VARICOSE VEINS OF LEFT LOWER EXTREMITY WITH OTHER COMPLICATIONS: ICD-10-CM

## 2024-12-12 PROCEDURE — 99213 OFFICE O/P EST LOW 20 MIN: CPT | Mod: 25

## 2024-12-12 PROCEDURE — 93971 EXTREMITY STUDY: CPT | Mod: LT

## 2025-01-16 ENCOUNTER — APPOINTMENT (OUTPATIENT)
Dept: VASCULAR SURGERY | Facility: CLINIC | Age: 85
End: 2025-01-16
Payer: MEDICARE

## 2025-01-16 VITALS
TEMPERATURE: 96.8 F | HEART RATE: 82 BPM | OXYGEN SATURATION: 98 % | DIASTOLIC BLOOD PRESSURE: 88 MMHG | SYSTOLIC BLOOD PRESSURE: 164 MMHG

## 2025-01-16 DIAGNOSIS — I87.2 VENOUS INSUFFICIENCY (CHRONIC) (PERIPHERAL): ICD-10-CM

## 2025-01-16 PROCEDURE — 36475 ENDOVENOUS RF 1ST VEIN: CPT | Mod: LT

## 2025-01-23 ENCOUNTER — APPOINTMENT (OUTPATIENT)
Dept: VASCULAR SURGERY | Facility: CLINIC | Age: 85
End: 2025-01-23
Payer: MEDICARE

## 2025-01-23 VITALS
HEART RATE: 76 BPM | SYSTOLIC BLOOD PRESSURE: 149 MMHG | TEMPERATURE: 97.3 F | OXYGEN SATURATION: 99 % | DIASTOLIC BLOOD PRESSURE: 78 MMHG

## 2025-01-23 DIAGNOSIS — I80.02 PHLEBITIS AND THROMBOPHLEBITIS OF SUPERFICIAL VESSELS OF LEFT LOWER EXTREMITY: ICD-10-CM

## 2025-01-23 DIAGNOSIS — I83.892 VARICOSE VEINS OF LEFT LOWER EXTREMITY WITH OTHER COMPLICATIONS: ICD-10-CM

## 2025-01-23 DIAGNOSIS — I83.812 VARICOSE VEINS OF LEFT LOWER EXTREMITY WITH PAIN: ICD-10-CM

## 2025-01-23 PROCEDURE — 93971 EXTREMITY STUDY: CPT | Mod: LT

## 2025-01-23 PROCEDURE — 99213 OFFICE O/P EST LOW 20 MIN: CPT | Mod: 25

## 2025-03-05 ENCOUNTER — APPOINTMENT (OUTPATIENT)
Dept: HEMATOLOGY ONCOLOGY | Facility: CLINIC | Age: 85
End: 2025-03-05

## 2025-03-15 ENCOUNTER — NON-APPOINTMENT (OUTPATIENT)
Age: 85
End: 2025-03-15

## 2025-03-18 ENCOUNTER — APPOINTMENT (OUTPATIENT)
Dept: HEMATOLOGY ONCOLOGY | Facility: CLINIC | Age: 85
End: 2025-03-18
Payer: MEDICARE

## 2025-03-18 VITALS
HEIGHT: 68 IN | OXYGEN SATURATION: 96 % | HEART RATE: 76 BPM | BODY MASS INDEX: 27.28 KG/M2 | TEMPERATURE: 97.7 F | WEIGHT: 180 LBS | SYSTOLIC BLOOD PRESSURE: 122 MMHG | DIASTOLIC BLOOD PRESSURE: 76 MMHG

## 2025-03-18 DIAGNOSIS — D50.9 IRON DEFICIENCY ANEMIA, UNSPECIFIED: ICD-10-CM

## 2025-03-18 DIAGNOSIS — D47.1 CHRONIC MYELOPROLIFERATIVE DISEASE: ICD-10-CM

## 2025-03-18 DIAGNOSIS — D75.839 THROMBOCYTOSIS, UNSPECIFIED: ICD-10-CM

## 2025-03-18 DIAGNOSIS — Z15.89 GENETIC SUSCEPTIBILITY TO OTHER DISEASE: ICD-10-CM

## 2025-03-18 DIAGNOSIS — R71.8 OTHER ABNORMALITY OF RED BLOOD CELLS: ICD-10-CM

## 2025-03-18 DIAGNOSIS — D72.829 ELEVATED WHITE BLOOD CELL COUNT, UNSPECIFIED: ICD-10-CM

## 2025-03-18 DIAGNOSIS — D58.2 OTHER HEMOGLOBINOPATHIES: ICD-10-CM

## 2025-03-18 DIAGNOSIS — D47.2 MONOCLONAL GAMMOPATHY: ICD-10-CM

## 2025-03-18 LAB
BASOPHILS # BLD AUTO: 0.04 K/UL
BASOPHILS NFR BLD AUTO: 0.7 %
EOSINOPHIL # BLD AUTO: 0.15 K/UL
EOSINOPHIL NFR BLD AUTO: 2.5 %
HCT VFR BLD CALC: 39 %
HGB BLD-MCNC: 13.1 G/DL
IMM GRANULOCYTES NFR BLD AUTO: 0.3 %
LYMPHOCYTES # BLD AUTO: 1.1 K/UL
LYMPHOCYTES NFR BLD AUTO: 18.6 %
MAN DIFF?: NORMAL
MCHC RBC-ENTMCNC: 33.6 G/DL
MCHC RBC-ENTMCNC: 36.9 PG
MCV RBC AUTO: 109.9 FL
MONOCYTES # BLD AUTO: 0.58 K/UL
MONOCYTES NFR BLD AUTO: 9.8 %
NEUTROPHILS # BLD AUTO: 4.02 K/UL
NEUTROPHILS NFR BLD AUTO: 68.1 %
PLATELET # BLD AUTO: 377 K/UL
PMV BLD AUTO: 0 /100 WBCS
RBC # BLD: 3.55 M/UL
RBC # BLD: 3.55 M/UL
RBC # FLD: 13.5 %
RETICS # AUTO: 2.2 %
RETICS AGGREG/RBC NFR: 78.1 K/UL
WBC # FLD AUTO: 5.91 K/UL

## 2025-03-18 PROCEDURE — 99214 OFFICE O/P EST MOD 30 MIN: CPT

## 2025-03-18 PROCEDURE — 36415 COLL VENOUS BLD VENIPUNCTURE: CPT

## 2025-03-18 PROCEDURE — G2211 COMPLEX E/M VISIT ADD ON: CPT

## 2025-03-19 LAB
ALBUMIN SERPL ELPH-MCNC: 4.3 G/DL
ALP BLD-CCNC: 89 U/L
ALT SERPL-CCNC: 20 U/L
ANION GAP SERPL CALC-SCNC: 11 MMOL/L
AST SERPL-CCNC: 21 U/L
B2 MICROGLOB SERPL-MCNC: 3.1 MG/L
BILIRUB SERPL-MCNC: 0.3 MG/DL
BUN SERPL-MCNC: 18 MG/DL
CALCIUM SERPL-MCNC: 9.3 MG/DL
CHLORIDE SERPL-SCNC: 102 MMOL/L
CO2 SERPL-SCNC: 21 MMOL/L
CREAT SERPL-MCNC: 0.83 MG/DL
CRP SERPL-MCNC: <3 MG/L
EGFRCR SERPLBLD CKD-EPI 2021: 70 ML/MIN/1.73M2
ERYTHROCYTE [SEDIMENTATION RATE] IN BLOOD BY WESTERGREN METHOD: 2 MM/HR
FERRITIN SERPL-MCNC: 89 NG/ML
FOLATE SERPL-MCNC: 17.7 NG/ML
GLUCOSE SERPL-MCNC: 112 MG/DL
HAPTOGLOB SERPL-MCNC: 59 MG/DL
IRON SATN MFR SERPL: 29 %
IRON SERPL-MCNC: 77 UG/DL
LDH SERPL-CCNC: 205 U/L
POTASSIUM SERPL-SCNC: 4.7 MMOL/L
POTASSIUM SERPL-SCNC: 4.8 MMOL/L
PROT SERPL-MCNC: 7.1 G/DL
SODIUM SERPL-SCNC: 135 MMOL/L
TIBC SERPL-MCNC: 266 UG/DL
UIBC SERPL-MCNC: 189 UG/DL
URATE SERPL-MCNC: 4.6 MG/DL
VIT B12 SERPL-MCNC: 399 PG/ML

## 2025-03-20 LAB — CA-I SERPL-SCNC: 5 MG/DL

## 2025-03-21 LAB — VISCOSITY, SERUM: 1.5

## 2025-03-26 LAB
ALBUMIN MFR SERPL ELPH: 62.8 %
ALBUMIN SERPL-MCNC: 4.5 G/DL
ALBUMIN/GLOB SERPL: 1.7 RATIO
ALPHA1 GLOB MFR SERPL ELPH: 2.4 %
ALPHA1 GLOB SERPL ELPH-MCNC: 0.2 G/DL
ALPHA2 GLOB MFR SERPL ELPH: 7.5 %
ALPHA2 GLOB SERPL ELPH-MCNC: 0.5 G/DL
B-GLOBULIN MFR SERPL ELPH: 8.1 %
B-GLOBULIN SERPL ELPH-MCNC: 0.6 G/DL
DEPRECATED KAPPA LC FREE/LAMBDA SER: 0.95 RATIO
GAMMA GLOB FLD ELPH-MCNC: 1.4 G/DL
GAMMA GLOB MFR SERPL ELPH: 19.2 %
IGA SER QL IEP: 63 MG/DL
IGG SER QL IEP: 1430 MG/DL
IGM SER QL IEP: 39 MG/DL
INTERPRETATION SERPL IEP-IMP: NORMAL
KAPPA LC CSF-MCNC: 1.87 MG/DL
KAPPA LC SERPL-MCNC: 1.77 MG/DL
M PROTEIN MFR SERPL ELPH: 14.3 %
M PROTEIN SPEC IFE-MCNC: NORMAL
MONOCLON BAND OBS SERPL: 1 G/DL
PROT SERPL-MCNC: 7.1 G/DL
PROT SERPL-MCNC: 7.1 G/DL

## 2025-03-27 ENCOUNTER — NON-APPOINTMENT (OUTPATIENT)
Age: 85
End: 2025-03-27

## 2025-05-20 ENCOUNTER — APPOINTMENT (OUTPATIENT)
Dept: VASCULAR SURGERY | Facility: CLINIC | Age: 85
End: 2025-05-20
Payer: MEDICARE

## 2025-05-20 VITALS
TEMPERATURE: 97.5 F | DIASTOLIC BLOOD PRESSURE: 76 MMHG | HEIGHT: 68 IN | BODY MASS INDEX: 26.98 KG/M2 | HEART RATE: 77 BPM | SYSTOLIC BLOOD PRESSURE: 135 MMHG | WEIGHT: 178 LBS | OXYGEN SATURATION: 98 %

## 2025-05-20 DIAGNOSIS — I83.812 VARICOSE VEINS OF LEFT LOWER EXTREMITY WITH PAIN: ICD-10-CM

## 2025-05-20 DIAGNOSIS — R25.2 CRAMP AND SPASM: ICD-10-CM

## 2025-05-20 DIAGNOSIS — I83.892 VARICOSE VEINS OF LEFT LOWER EXTREMITY WITH OTHER COMPLICATIONS: ICD-10-CM

## 2025-05-20 DIAGNOSIS — M79.605 PAIN IN LEFT LEG: ICD-10-CM

## 2025-05-20 DIAGNOSIS — I87.2 VENOUS INSUFFICIENCY (CHRONIC) (PERIPHERAL): ICD-10-CM

## 2025-05-20 DIAGNOSIS — M79.89 OTHER SPECIFIED SOFT TISSUE DISORDERS: ICD-10-CM

## 2025-05-20 PROCEDURE — 99213 OFFICE O/P EST LOW 20 MIN: CPT | Mod: 25

## 2025-05-20 PROCEDURE — 93971 EXTREMITY STUDY: CPT | Mod: LT

## 2025-06-18 ENCOUNTER — APPOINTMENT (OUTPATIENT)
Dept: VASCULAR SURGERY | Facility: CLINIC | Age: 85
End: 2025-06-18

## 2025-06-25 ENCOUNTER — APPOINTMENT (OUTPATIENT)
Dept: VASCULAR SURGERY | Facility: CLINIC | Age: 85
End: 2025-06-25

## 2025-06-30 ENCOUNTER — APPOINTMENT (OUTPATIENT)
Dept: HEMATOLOGY ONCOLOGY | Facility: CLINIC | Age: 85
End: 2025-06-30
Payer: MEDICARE

## 2025-06-30 VITALS
HEART RATE: 83 BPM | BODY MASS INDEX: 27.89 KG/M2 | DIASTOLIC BLOOD PRESSURE: 68 MMHG | OXYGEN SATURATION: 96 % | HEIGHT: 68 IN | TEMPERATURE: 97.2 F | WEIGHT: 184 LBS | SYSTOLIC BLOOD PRESSURE: 140 MMHG

## 2025-06-30 PROBLEM — D47.2 IGG LAMBDA MONOCLONAL GAMMOPATHY: Status: ACTIVE | Noted: 2025-06-30

## 2025-06-30 PROCEDURE — 99214 OFFICE O/P EST MOD 30 MIN: CPT

## 2025-06-30 PROCEDURE — G2211 COMPLEX E/M VISIT ADD ON: CPT

## 2025-06-30 PROCEDURE — 36415 COLL VENOUS BLD VENIPUNCTURE: CPT

## 2025-07-01 LAB
ALBUMIN SERPL ELPH-MCNC: 4.3 G/DL
ALP BLD-CCNC: 93 U/L
ALT SERPL-CCNC: 27 U/L
ANION GAP SERPL CALC-SCNC: 14 MMOL/L
ANISOCYTOSIS BLD QL: SLIGHT
AST SERPL-CCNC: 29 U/L
AUTO BASOPHILS #: 0.03 K/UL
AUTO BASOPHILS %: 0.6 %
AUTO EOSINOPHILS #: 0.08 K/UL
AUTO EOSINOPHILS %: 1.5 %
AUTO IMMATURE GRANULOCYTES #: 0.04 K/UL
AUTO LYMPHOCYTES #: 0.8 K/UL
AUTO LYMPHOCYTES %: 15.1 %
AUTO MONOCYTES #: 0.51 K/UL
AUTO MONOCYTES %: 9.6 %
AUTO NEUTROPHILS #: 3.83 K/UL
AUTO NEUTROPHILS %: 72.4 %
AUTO NRBC #: 0 K/UL
B2 MICROGLOB SERPL-MCNC: 3.1 MG/L
BILIRUB SERPL-MCNC: 0.4 MG/DL
BUN SERPL-MCNC: 21 MG/DL
CALCIUM SERPL-MCNC: 9.1 MG/DL
CHLORIDE SERPL-SCNC: 100 MMOL/L
CO2 SERPL-SCNC: 19 MMOL/L
CREAT SERPL-MCNC: 0.8 MG/DL
CRP SERPL-MCNC: <3 MG/L
EGFRCR SERPLBLD CKD-EPI 2021: 73 ML/MIN/1.73M2
ERYTHROCYTE [SEDIMENTATION RATE] IN BLOOD BY WESTERGREN METHOD: 4 MM/HR
FERRITIN SERPL-MCNC: 87 NG/ML
FOLATE SERPL-MCNC: >20 NG/ML
GIANT PLATELETS BLD QL SMEAR: PRESENT
GLUCOSE SERPL-MCNC: 105 MG/DL
HAPTOGLOB SERPL-MCNC: 55 MG/DL
HCT VFR BLD CALC: 38.2 %
HGB BLD-MCNC: 12.6 G/DL
HYPOCHROMIA BLD QL SMEAR: SLIGHT
IMM GRANULOCYTES NFR BLD AUTO: 0.8 %
IMMATURE RETICULOCYTE FRACTION %: 14.8 %
IRON SATN MFR SERPL: 30 %
IRON SERPL-MCNC: 86 UG/DL
LDH SERPL-CCNC: 245 U/L
MACROCYTES BLD QL SMEAR: SLIGHT
MAN DIFF?: NORMAL
MANUAL BASOPHILS #: 0.04 K/UL
MANUAL BASOPHILS %: 0.8 %
MANUAL EOSINOPHILS #: 0.13 K/UL
MANUAL EOSINOPHILS %: 2.5 %
MANUAL LYMPHOCYTES #: 1.16 K/UL
MANUAL LYMPHOCYTES %: 22 %
MANUAL MONOCYTES #: 0.31 K/UL
MANUAL MONOCYTES %: 5.9 %
MANUAL NEUTROPHILS #: 3.64 K/UL
MANUAL NEUTROPHILS %: 68.8 %
MCHC RBC-ENTMCNC: 33 G/DL
MCHC RBC-ENTMCNC: 36.6 PG
MCV RBC AUTO: 111 FL
OVALOCYTES BLD QL SMEAR: SLIGHT
PLAT MORPH BLD: ABNORMAL
PLATELET # BLD AUTO: 448 K/UL
PLATELET ESTIMATE: NORMAL
PMV BLD AUTO: 0 /100 WBCS
PMV BLD: 11.3 FL
POIKILOCYTOSIS BLD QL SMEAR: SLIGHT
POLYCHROMASIA BLD QL SMEAR: SLIGHT
POTASSIUM SERPL-SCNC: 5.2 MMOL/L
POTASSIUM SERPL-SCNC: 5.5 MMOL/L
PROT SERPL-MCNC: 7 G/DL
RBC # BLD: 3.44 M/UL
RBC # BLD: 3.44 M/UL
RBC # FLD: 12.8 %
RBC BLD AUTO: ABNORMAL
RETICS # AUTO: 2.2 %
RETICS AGGREG/RBC NFR: 75.7 K/UL
RETICULOCYTE HEMOGLOBIN EQUIVALENT: 39.3 PG
SCHISTOCYTES BLD QL SMEAR: SLIGHT
SODIUM SERPL-SCNC: 133 MMOL/L
SPHEROCYTES BLD QL SMEAR: SLIGHT
TIBC SERPL-MCNC: 288 UG/DL
UIBC SERPL-MCNC: 202 UG/DL
URATE SERPL-MCNC: 4.5 MG/DL
VIT B12 SERPL-MCNC: 537 PG/ML
WBC # FLD AUTO: 5.29 K/UL

## 2025-07-02 LAB — CA-I SERPL-SCNC: 5 MG/DL

## 2025-07-03 ENCOUNTER — NON-APPOINTMENT (OUTPATIENT)
Age: 85
End: 2025-07-03

## 2025-08-23 ENCOUNTER — NON-APPOINTMENT (OUTPATIENT)
Age: 85
End: 2025-08-23

## 2025-08-25 ENCOUNTER — APPOINTMENT (OUTPATIENT)
Dept: VASCULAR SURGERY | Facility: CLINIC | Age: 85
End: 2025-08-25
Payer: MEDICARE

## 2025-08-25 VITALS
HEART RATE: 72 BPM | DIASTOLIC BLOOD PRESSURE: 82 MMHG | SYSTOLIC BLOOD PRESSURE: 136 MMHG | OXYGEN SATURATION: 98 % | TEMPERATURE: 97.8 F

## 2025-08-25 DIAGNOSIS — L97.909 NON-PRESSURE CHRONIC ULCER OF UNSPECIFIED PART OF UNSPECIFIED LOWER LEG WITH UNSPECIFIED SEVERITY: ICD-10-CM

## 2025-08-25 DIAGNOSIS — M79.605 PAIN IN LEFT LEG: ICD-10-CM

## 2025-08-25 DIAGNOSIS — L30.9 DERMATITIS, UNSPECIFIED: ICD-10-CM

## 2025-08-25 PROBLEM — I83.029 VENOUS STASIS ULCER OF LEFT LOWER EXTREMITY: Status: ACTIVE | Noted: 2025-08-25

## 2025-08-25 PROBLEM — I87.2 DERMATITIS, STASIS: Status: ACTIVE | Noted: 2025-08-25

## 2025-08-25 PROCEDURE — 29580 STRAPPING UNNA BOOT: CPT | Mod: LT

## 2025-08-25 PROCEDURE — 99213 OFFICE O/P EST LOW 20 MIN: CPT | Mod: 25

## 2025-09-02 ENCOUNTER — APPOINTMENT (OUTPATIENT)
Dept: VASCULAR SURGERY | Facility: CLINIC | Age: 85
End: 2025-09-02
Payer: MEDICARE

## 2025-09-02 VITALS
DIASTOLIC BLOOD PRESSURE: 77 MMHG | TEMPERATURE: 97.3 F | OXYGEN SATURATION: 97 % | SYSTOLIC BLOOD PRESSURE: 132 MMHG | HEART RATE: 67 BPM

## 2025-09-02 DIAGNOSIS — I87.2 VENOUS INSUFFICIENCY (CHRONIC) (PERIPHERAL): ICD-10-CM

## 2025-09-02 DIAGNOSIS — I83.029 VARICOSE VEINS OF LEFT LOWER EXTREMITY WITH ULCER OF UNSPECIFIED SITE: ICD-10-CM

## 2025-09-02 DIAGNOSIS — I83.812 VARICOSE VEINS OF LEFT LOWER EXTREMITY WITH PAIN: ICD-10-CM

## 2025-09-02 DIAGNOSIS — L97.929 VARICOSE VEINS OF LEFT LOWER EXTREMITY WITH ULCER OF UNSPECIFIED SITE: ICD-10-CM

## 2025-09-02 DIAGNOSIS — M79.605 PAIN IN LEFT LEG: ICD-10-CM

## 2025-09-02 PROCEDURE — 99213 OFFICE O/P EST LOW 20 MIN: CPT | Mod: 25

## 2025-09-02 PROCEDURE — 29580 STRAPPING UNNA BOOT: CPT | Mod: LT

## 2025-09-08 ENCOUNTER — APPOINTMENT (OUTPATIENT)
Dept: INTERNAL MEDICINE | Facility: CLINIC | Age: 85
End: 2025-09-08
Payer: MEDICARE

## 2025-09-08 VITALS
DIASTOLIC BLOOD PRESSURE: 72 MMHG | SYSTOLIC BLOOD PRESSURE: 150 MMHG | WEIGHT: 184 LBS | HEIGHT: 68 IN | HEART RATE: 67 BPM | TEMPERATURE: 97.2 F | BODY MASS INDEX: 27.89 KG/M2 | OXYGEN SATURATION: 98 %

## 2025-09-08 DIAGNOSIS — L97.909 NON-PRESSURE CHRONIC ULCER OF UNSPECIFIED PART OF UNSPECIFIED LOWER LEG WITH UNSPECIFIED SEVERITY: ICD-10-CM

## 2025-09-08 PROCEDURE — G2211 COMPLEX E/M VISIT ADD ON: CPT

## 2025-09-08 PROCEDURE — 99203 OFFICE O/P NEW LOW 30 MIN: CPT

## 2025-09-15 ENCOUNTER — APPOINTMENT (OUTPATIENT)
Dept: VASCULAR SURGERY | Facility: CLINIC | Age: 85
End: 2025-09-15
Payer: MEDICARE

## 2025-09-15 VITALS
HEART RATE: 69 BPM | SYSTOLIC BLOOD PRESSURE: 125 MMHG | TEMPERATURE: 97.2 F | OXYGEN SATURATION: 99 % | DIASTOLIC BLOOD PRESSURE: 78 MMHG

## 2025-09-15 DIAGNOSIS — I87.2 VENOUS INSUFFICIENCY (CHRONIC) (PERIPHERAL): ICD-10-CM

## 2025-09-15 DIAGNOSIS — I83.812 VARICOSE VEINS OF LEFT LOWER EXTREMITY WITH PAIN: ICD-10-CM

## 2025-09-15 DIAGNOSIS — L97.929 VARICOSE VEINS OF LEFT LOWER EXTREMITY WITH ULCER OF UNSPECIFIED SITE: ICD-10-CM

## 2025-09-15 DIAGNOSIS — L30.9 DERMATITIS, UNSPECIFIED: ICD-10-CM

## 2025-09-15 DIAGNOSIS — I83.029 VARICOSE VEINS OF LEFT LOWER EXTREMITY WITH ULCER OF UNSPECIFIED SITE: ICD-10-CM

## 2025-09-15 PROCEDURE — 29580 STRAPPING UNNA BOOT: CPT | Mod: LT

## 2025-09-15 PROCEDURE — 99213 OFFICE O/P EST LOW 20 MIN: CPT | Mod: 25
